# Patient Record
Sex: MALE | Race: WHITE | NOT HISPANIC OR LATINO | Employment: UNEMPLOYED | URBAN - METROPOLITAN AREA
[De-identification: names, ages, dates, MRNs, and addresses within clinical notes are randomized per-mention and may not be internally consistent; named-entity substitution may affect disease eponyms.]

---

## 2017-09-09 ENCOUNTER — HOSPITAL ENCOUNTER (EMERGENCY)
Facility: CLINIC | Age: 47
Discharge: HOME | End: 2017-09-09
Attending: EMERGENCY MEDICINE | Admitting: EMERGENCY MEDICINE
Payer: COMMERCIAL

## 2017-09-09 ENCOUNTER — APPOINTMENT (OUTPATIENT)
Dept: ULTRASOUND IMAGING | Facility: CLINIC | Age: 47
End: 2017-09-09
Attending: EMERGENCY MEDICINE
Payer: COMMERCIAL

## 2017-09-09 VITALS
HEART RATE: 93 BPM | RESPIRATION RATE: 18 BRPM | OXYGEN SATURATION: 98 % | SYSTOLIC BLOOD PRESSURE: 147 MMHG | BODY MASS INDEX: 28.7 KG/M2 | HEIGHT: 71 IN | DIASTOLIC BLOOD PRESSURE: 95 MMHG | WEIGHT: 205 LBS | TEMPERATURE: 98 F

## 2017-09-09 DIAGNOSIS — K80.20 SYMPTOMATIC CHOLELITHIASIS: ICD-10-CM

## 2017-09-09 LAB
ALBUMIN SERPL-MCNC: 3.9 G/DL (ref 3.4–5)
ALP SERPL-CCNC: 78 U/L (ref 40–150)
ALT SERPL W P-5'-P-CCNC: 60 U/L (ref 0–70)
ANION GAP SERPL CALCULATED.3IONS-SCNC: 8 MMOL/L (ref 3–14)
AST SERPL W P-5'-P-CCNC: 62 U/L (ref 0–45)
BASOPHILS # BLD AUTO: 0.1 10E9/L (ref 0–0.2)
BASOPHILS NFR BLD AUTO: 0.7 %
BILIRUB SERPL-MCNC: 0.6 MG/DL (ref 0.2–1.3)
BUN SERPL-MCNC: 17 MG/DL (ref 7–30)
CALCIUM SERPL-MCNC: 9.2 MG/DL (ref 8.5–10.1)
CHLORIDE SERPL-SCNC: 103 MMOL/L (ref 94–109)
CO2 SERPL-SCNC: 28 MMOL/L (ref 20–32)
CREAT SERPL-MCNC: 1.06 MG/DL (ref 0.66–1.25)
DIFFERENTIAL METHOD BLD: ABNORMAL
EOSINOPHIL # BLD AUTO: 0.3 10E9/L (ref 0–0.7)
EOSINOPHIL NFR BLD AUTO: 3.1 %
ERYTHROCYTE [DISTWIDTH] IN BLOOD BY AUTOMATED COUNT: 12.9 % (ref 10–15)
GFR SERPL CREATININE-BSD FRML MDRD: 75 ML/MIN/1.7M2
GLUCOSE SERPL-MCNC: 100 MG/DL (ref 70–99)
HCT VFR BLD AUTO: 50.1 % (ref 40–53)
HGB BLD-MCNC: 16.9 G/DL (ref 13.3–17.7)
IMM GRANULOCYTES # BLD: 0 10E9/L (ref 0–0.4)
IMM GRANULOCYTES NFR BLD: 0.2 %
INTERPRETATION ECG - MUSE: NORMAL
LIPASE SERPL-CCNC: 135 U/L (ref 73–393)
LYMPHOCYTES # BLD AUTO: 2.5 10E9/L (ref 0.8–5.3)
LYMPHOCYTES NFR BLD AUTO: 28.7 %
MCH RBC QN AUTO: 27.9 PG (ref 26.5–33)
MCHC RBC AUTO-ENTMCNC: 33.7 G/DL (ref 31.5–36.5)
MCV RBC AUTO: 83 FL (ref 78–100)
MONOCYTES # BLD AUTO: 1 10E9/L (ref 0–1.3)
MONOCYTES NFR BLD AUTO: 11.4 %
NEUTROPHILS # BLD AUTO: 4.9 10E9/L (ref 1.6–8.3)
NEUTROPHILS NFR BLD AUTO: 55.9 %
NRBC # BLD AUTO: 0 10*3/UL
NRBC BLD AUTO-RTO: 0 /100
PLATELET # BLD AUTO: 296 10E9/L (ref 150–450)
POTASSIUM SERPL-SCNC: 4.1 MMOL/L (ref 3.4–5.3)
PROT SERPL-MCNC: 8.1 G/DL (ref 6.8–8.8)
RBC # BLD AUTO: 6.06 10E12/L (ref 4.4–5.9)
SODIUM SERPL-SCNC: 139 MMOL/L (ref 133–144)
WBC # BLD AUTO: 8.8 10E9/L (ref 4–11)

## 2017-09-09 PROCEDURE — 85025 COMPLETE CBC W/AUTO DIFF WBC: CPT | Performed by: EMERGENCY MEDICINE

## 2017-09-09 PROCEDURE — 93005 ELECTROCARDIOGRAM TRACING: CPT

## 2017-09-09 PROCEDURE — 96361 HYDRATE IV INFUSION ADD-ON: CPT

## 2017-09-09 PROCEDURE — 83690 ASSAY OF LIPASE: CPT | Performed by: EMERGENCY MEDICINE

## 2017-09-09 PROCEDURE — 25000128 H RX IP 250 OP 636: Performed by: EMERGENCY MEDICINE

## 2017-09-09 PROCEDURE — 76705 ECHO EXAM OF ABDOMEN: CPT

## 2017-09-09 PROCEDURE — 96374 THER/PROPH/DIAG INJ IV PUSH: CPT

## 2017-09-09 PROCEDURE — 99285 EMERGENCY DEPT VISIT HI MDM: CPT | Mod: 25

## 2017-09-09 PROCEDURE — 80053 COMPREHEN METABOLIC PANEL: CPT | Performed by: EMERGENCY MEDICINE

## 2017-09-09 RX ORDER — HYDROCODONE BITARTRATE AND ACETAMINOPHEN 5; 325 MG/1; MG/1
1 TABLET ORAL EVERY 6 HOURS PRN
Qty: 15 TABLET | Refills: 0 | Status: ON HOLD | OUTPATIENT
Start: 2017-09-09 | End: 2017-09-16

## 2017-09-09 RX ORDER — MORPHINE SULFATE 4 MG/ML
4 INJECTION, SOLUTION INTRAMUSCULAR; INTRAVENOUS
Status: DISCONTINUED | OUTPATIENT
Start: 2017-09-09 | End: 2017-09-09 | Stop reason: HOSPADM

## 2017-09-09 RX ADMIN — SODIUM CHLORIDE 1000 ML: 9 INJECTION, SOLUTION INTRAVENOUS at 00:39

## 2017-09-09 RX ADMIN — MORPHINE SULFATE 4 MG: 4 INJECTION, SOLUTION INTRAMUSCULAR; INTRAVENOUS at 00:39

## 2017-09-09 ASSESSMENT — ENCOUNTER SYMPTOMS
VOMITING: 0
ABDOMINAL PAIN: 1
FEVER: 0
BLOOD IN STOOL: 0

## 2017-09-09 NOTE — ED NOTES
"Pt presents with right upper quadrant  With a \"pacratic attack\" 7/10 pain comes and goes. Has been having significant pain in the last week. ABC's intact. A&0x4  "

## 2017-09-09 NOTE — DISCHARGE INSTRUCTIONS
Discharge Instructions  Biliary Colic    You have been seen today for biliary colic. Biliary colic is the pain that happens when gallstones block the normal flow of bile from the gallbladder.  It usually is a steady or crampy pain in the upper abdomen (belly), most often under the right side of the rib cage where the gallbladder is. Sometimes you get pain from the gallbladder in your back or shoulder. It is common to have nausea (sick to stomach) and vomiting (throwing up) with biliary colic.    Bile is a liquid the body makes to help with digesting fat.  It is made by the liver and stored in the gallbladder and released from the gall bladder when you eat fatty foods. Gallstones can form for a variety of reasons. Risk factors for gallstones include being female, having a family history of gallstones, being older, being pregnant or having been pregnant, having diabetes, having rapid weight loss, and others.      Once gallstones form, surgeons usually tell you to have your gallbladder removed. There is medicine that can dissolve gallstones, but it can be unpleasant to take, and gallstones tend to come back when you quit taking the medicine. Your regular provider can help decide on the right treatment for you, and may refer you to a surgeon to discuss whether surgery is right in your case.     Complications of gallstones include infection, jaundice, inflammation of the pancreas, and rupture of the gallbladder. One of these complications will happen to about one out of every four patients with gallstones over the next 10-20 years if they are not treated.       Generally, every Emergency Department visit should have a follow-up clinic visit with either a primary or a specialty clinic/provider. Please follow-up as instructed by your emergency provider today.    Return to the Emergency Department if you develop:    Fever greater than 100.5 F.     Persistent nausea and vomiting.    Pain that will not go away with the  medicines you were given here.    Yellow skin or eye color (jaundice).    Other new concerning symptoms.    What can I do to help myself?    Eat regular meals at least three times a day, to make the gallbladder empty before it gets too full.    Avoid fried or fatty foods.    Drink plenty of clear fluids.    Take over-the-counter or prescribed pain medications as recommended by your provider.      If you were given a prescription for medicine here today, be sure to read all of the information (including the package insert) that comes with your prescription.  This will include important information about the medicine, its side effects, and any warnings that you need to know about.  The pharmacist who fills the prescription can provide more information and answer questions you may have about the medicine.  If you have questions or concerns that the pharmacist cannot address, please call or return to the Emergency Department.     Remember that you can always come back to the Emergency Department if you are not able to see your regular provider in the amount of time listed above, if you get any new symptoms, or if there is anything that worries you.

## 2017-09-09 NOTE — ED AVS SNAPSHOT
[facility] Emergency Department    [address]    [facility] [address]    Phone:  [phone]    Fax:  [phone]                                       Edward Nessa   MRN: [id number]    Department:  [facility] Emergency Department   Date of Visit:  9/9/2017           After Visit Summary Signature Page     I have received my discharge instructions, and my questions have been answered. I have discussed any challenges I see with this plan with the nurse or doctor.    ..........................................................................................................................................  Patient/Patient Representative Signature      ..........................................................................................................................................  Patient Representative Print Name and Relationship to Patient    ..................................................               ................................................  Date                                            Time    ..........................................................................................................................................  Reviewed by Signature/Title    ...................................................              ..............................................  Date                                                            Time

## 2017-09-09 NOTE — ED PROVIDER NOTES
"  History   Chief Complaint:  Abdominal Pain    HPI   Edward Szymanski is a 47 year old male with a history of pancreatitis who presents to the ED for evaluation of upper abdominal pain currently worse on the left than right; no cholelithiasis present during pancreatitis diagnosis in 2015. The patient reports he was diagnosed with pancreatitis in 2015 and was suppose to get a cholecystectomy but did not. The patient had a few minor  \"pain attacks\" in 2015 and 2016, but about 1.5 months ago he began having more prolonged intense \"pain attacks\" of abdominal pain. He denies black/bloody stool, vomiting, fever, smoking, or alcohol use.     Allergies:  No known drug allergies    Medications:    Omega-3 Fatty Acids (OMEGA-3 FISH OIL PO)   ZYRTEC 10 MG OR TABS     Past Medical History:    Hyperlipidemia  NEFTALY  Pancreatitis     Past Surgical History:    History reviewed. No pertinent surgical history.    Family History:    Muscular dystrophy     Social History:  Marital Status:   [2]  PCP Darren Henderson  Smoking status: no  Alcohol use: no    Review of Systems   Constitutional: Negative for fever.   Gastrointestinal: Positive for abdominal pain. Negative for blood in stool and vomiting.   All other systems reviewed and are negative.    Physical Exam   Patient Vitals for the past 24 hrs:   BP Temp Pulse Heart Rate Resp SpO2 Height Weight   09/09/17 0017 (!) 154/107 98  F (36.7  C) 93 93 18 98 % 1.803 m (5' 11\") 93 kg (205 lb)        Physical Exam  Constitutional: Alert, attentive, mild pain distress  HENT:    Nose: Nose normal.    Mouth/Throat: Oropharynx is clear, mucous membranes are moist  Eyes:  EOM are normal. Pupils are equal, round, and reactive to light.   CV:  regular rate and rhythm; no murmurs, rubs or gallups  Chest: Effort normal and breath sounds normal.   GI:   Epigastrium - mild tenderness, no guarding   RUQ - no tenderness or Adam's sign   RLQ - No tenderness, no guarding, no Rovsing's " sign   Suprapubic area - no tenderness, no guarding    LLQ - no tenderness, no guarding   LUQ - no tenderness, no guarding   No distension. Normal bowel sounds  MSK: Normal range of motion.   Neurological: Alert, attentive  Skin: Skin is warm and dry.      Emergency Department Course   ECG (00:19:51):  Rate 104 bpm. SD interval 150. QRS duration 94. QT/QTc 356/468. P-R-T axes 56 64 26. Sinus tachycardia, otherwise normal ECG. No significant changes compared to EKG dated 5/16/15. Interpreted at 0020 by Mike Pitts MD.    Imaging:  Radiographic findings were communicated with the patient who voiced understanding of the findings.  US abdomen   There are stones and sludge in the gallbladder. The  gallbladder wall is mildly thickened, a nonspecific finding. No other  evidence of acute cholecystitis. No biliary dilatation.  As read by Radiology.    Laboratory:  CBC: WNL (WBC 8.8, HGB 16.9, )   CMP: glucose 100(H), AST 62(H) o/w WNL (Creatinine 1.06)  Lipase: 135    Interventions:  0039 NS 1L IV Bolus  0039 Morphine 4 mg IV    Emergency Department Course:  Past medical records, nursing notes, and vitals reviewed.  0018: I performed an exam of the patient and obtained history, as documented above.  The patient was sent for an US abdomen while in the emergency department, findings above.  IV inserted and blood drawn.  0019 ECG was obtained - see results above.   0039 NS 1L IV Bolus  0039 Morphine 4 mg IV  0155: I rechecked the patient.  Findings and plan explained to the Patient. Patient discharged home with instructions regarding supportive care, medications, and reasons to return. The importance of close follow-up was reviewed.   Impression & Plan    Medical Decision Making:  Edward Szymanski is a 47 year old male with history of pancreatitis in 2015, possibly related to sludge and/or cholelithiasis (although no documented cholelithiasis is noted on imaging at the time), who presented with abdominal pain and  symptoms consistent with biliary colic.  Ultrasound has confirmed the presence of gallstones.  There is no clinical, laboratory, or ultrasound evidence of cholecystitis, choledocholithiasis, gallstone pancreatitis, or ascending cholangitis.  There are no chest pain or ACS equivalent symptoms to suggest the patient s symptoms are cardiac in nature. There is no lower abdominal tenderness to suggest appendicitis, diverticulitis, or other acute process. On recheck, he is feeling significantly better. The re-exam is benign, pain is controlled, and he is tolerating POs. I recommended avoiding fatty foods, and to return to the ED immediately for uncontrolled pain, vomiting, fever, or any other concerning symptoms. I discussed the natural history of symptomatic cholelithiasis, and I recommended outpatient follow up with general surgery in 3-5 days for further consultation and care.  Analgesics and anti-emetics have been prescribed.      Diagnosis:    ICD-10-CM   1. Symptomatic cholelithiasis K80.20     Disposition:  Discharged to home    Discharge Medications:  New Prescriptions    HYDROCODONE-ACETAMINOPHEN (NORCO) 5-325 MG PER TABLET    Take 1 tablet by mouth every 6 hours as needed for moderate to severe pain     Nanette Pacheco  9/9/2017   [facility] EMERGENCY DEPARTMENT    I, Nanette Pacheco, am serving as a scribe at 12:18 AM on 9/9/2017 to document services personally performed by Mike Pitts MD based on my observations and the provider's statements to me.        Mike Pitts MD  09/09/17 3746

## 2017-09-09 NOTE — ED AVS SNAPSHOT
[facility] Emergency Department    [address]    [place] [address]    Phone:  [phone]    Fax:  [phone]                                       Edward Szymanski   MRN: [id number]    Department:  [facility] Emergency Department   Date of Visit:  9/9/2017           Patient Information     Date Of Birth          1970        Your diagnoses for this visit were:     Symptomatic cholelithiasis        You were seen by Mike Pitts MD.      Follow-up Information     Follow up with Demond Zhou MD In 3 days.    Specialty:  General Surgery    Contact information:    [address]  [place] [address]  [phone]          Discharge Instructions       Discharge Instructions  Biliary Colic    You have been seen today for biliary colic. Biliary colic is the pain that happens when gallstones block the normal flow of bile from the gallbladder.  It usually is a steady or crampy pain in the upper abdomen (belly), most often under the right side of the rib cage where the gallbladder is. Sometimes you get pain from the gallbladder in your back or shoulder. It is common to have nausea (sick to stomach) and vomiting (throwing up) with biliary colic.    Bile is a liquid the body makes to help with digesting fat.  It is made by the liver and stored in the gallbladder and released from the gall bladder when you eat fatty foods. Gallstones can form for a variety of reasons. Risk factors for gallstones include being female, having a family history of gallstones, being older, being pregnant or having been pregnant, having diabetes, having rapid weight loss, and others.      Once gallstones form, surgeons usually tell you to have your gallbladder removed. There is medicine that can dissolve gallstones, but it can be unpleasant to take, and gallstones tend to come back when you quit taking the medicine. Your regular provider can help decide on the right treatment for  you, and may refer you to a surgeon to discuss whether surgery is right in your case.     Complications of gallstones include infection, jaundice, inflammation of the pancreas, and rupture of the gallbladder. One of these complications will happen to about one out of every four patients with gallstones over the next 10-20 years if they are not treated.       Generally, every Emergency Department visit should have a follow-up clinic visit with either a primary or a specialty clinic/provider. Please follow-up as instructed by your emergency provider today.    Return to the Emergency Department if you develop:    Fever greater than 100.5 F.     Persistent nausea and vomiting.    Pain that will not go away with the medicines you were given here.    Yellow skin or eye color (jaundice).    Other new concerning symptoms.    What can I do to help myself?    Eat regular meals at least three times a day, to make the gallbladder empty before it gets too full.    Avoid fried or fatty foods.    Drink plenty of clear fluids.    Take over-the-counter or prescribed pain medications as recommended by your provider.      If you were given a prescription for medicine here today, be sure to read all of the information (including the package insert) that comes with your prescription.  This will include important information about the medicine, its side effects, and any warnings that you need to know about.  The pharmacist who fills the prescription can provide more information and answer questions you may have about the medicine.  If you have questions or concerns that the pharmacist cannot address, please call or return to the Emergency Department.     Remember that you can always come back to the Emergency Department if you are not able to see your regular provider in the amount of time listed above, if you get any new symptoms, or if there is anything that worries you.      24 Hour Appointment Hotline       To make an appointment at  any [facility] clinic, call [phone] ([phone]). If you don't have a family doctor or clinic, we will help you find one. [facility] clinics are conveniently located to serve the needs of you and your family.             Review of your medicines      START taking        Dose / Directions Last dose taken    HYDROcodone-acetaminophen 5-325 MG per tablet   Commonly known as:  NORCO   Dose:  1 tablet   Quantity:  15 tablet        Take 1 tablet by mouth every 6 hours as needed for moderate to severe pain   Refills:  0          Our records show that you are taking the medicines listed below. If these are incorrect, please call your family doctor or clinic.        Dose / Directions Last dose taken    OMEGA-3 FISH OIL PO        Take by mouth daily   Refills:  0        ZYRTEC 10 MG tablet   Quantity:  3 MONTHS   Generic drug:  cetirizine        ONE TABLET DAILY   Refills:  3                Prescriptions were sent or printed at these locations (1 Prescription)                   Other Prescriptions                Printed at Department/Unit printer (1 of 1)         HYDROcodone-acetaminophen (NORCO) 5-325 MG per tablet                Procedures and tests performed during your visit     CBC + differential    Comprehensive metabolic panel    EKG 12 lead    Lipase    US Abdomen Limited      Orders Needing Specimen Collection     None      Pending Results     Date and Time Order Name Status Description    9/9/2017 0023 US Abdomen Limited Preliminary     9/9/2017 0017 EKG 12 lead Preliminary             Pending Culture Results     No orders found from 9/7/2017 to 9/10/2017.            Pending Results Instructions     If you had any lab results that were not finalized at the time of your Discharge, you can call the ED Lab Result RN at [phone]. You will be contacted by this team for any positive Lab results or changes in treatment. The nurses are available 7 days a week from 10A to 6:30P.  You can leave a message 24 hours  per day and they will return your call.        Test Results From Your Hospital Stay        9/9/2017 12:41 AM      Component Results     Component Value Ref Range & Units Status    WBC 8.8 4.0 - 11.0 10e9/L Final    RBC Count 6.06 (H) 4.4 - 5.9 10e12/L Final    Hemoglobin 16.9 13.3 - 17.7 g/dL Final    Hematocrit 50.1 40.0 - 53.0 % Final    MCV 83 78 - 100 fl Final    MCH 27.9 26.5 - 33.0 pg Final    MCHC 33.7 31.5 - 36.5 g/dL Final    RDW 12.9 10.0 - 15.0 % Final    Platelet Count 296 150 - 450 10e9/L Final    Diff Method Automated Method  Final    % Neutrophils 55.9 % Final    % Lymphocytes 28.7 % Final    % Monocytes 11.4 % Final    % Eosinophils 3.1 % Final    % Basophils 0.7 % Final    % Immature Granulocytes 0.2 % Final    Nucleated RBCs 0 0 /100 Final    Absolute Neutrophil 4.9 1.6 - 8.3 10e9/L Final    Absolute Lymphocytes 2.5 0.8 - 5.3 10e9/L Final    Absolute Monocytes 1.0 0.0 - 1.3 10e9/L Final    Absolute Eosinophils 0.3 0.0 - 0.7 10e9/L Final    Absolute Basophils 0.1 0.0 - 0.2 10e9/L Final    Abs Immature Granulocytes 0.0 0 - 0.4 10e9/L Final    Absolute Nucleated RBC 0.0  Final         9/9/2017 12:58 AM      Component Results     Component Value Ref Range & Units Status    Sodium 139 133 - 144 mmol/L Final    Potassium 4.1 3.4 - 5.3 mmol/L Final    Chloride 103 94 - 109 mmol/L Final    Carbon Dioxide 28 20 - 32 mmol/L Final    Anion Gap 8 3 - 14 mmol/L Final    Glucose 100 (H) 70 - 99 mg/dL Final    Urea Nitrogen 17 7 - 30 mg/dL Final    Creatinine 1.06 0.66 - 1.25 mg/dL Final    GFR Estimate 75 >60 mL/min/1.7m2 Final    Non  GFR Calc    GFR Estimate If Black >90 >60 mL/min/1.7m2 Final    African American GFR Calc    Calcium 9.2 8.5 - 10.1 mg/dL Final    Bilirubin Total 0.6 0.2 - 1.3 mg/dL Final    Albumin 3.9 3.4 - 5.0 g/dL Final    Protein Total 8.1 6.8 - 8.8 g/dL Final    Alkaline Phosphatase 78 40 - 150 U/L Final    ALT 60 0 - 70 U/L Final    AST 62 (H) 0 - 45 U/L Final          9/9/2017 12:58 AM      Component Results     Component Value Ref Range & Units Status    Lipase 135 73 - 393 U/L Final         9/9/2017  1:26 AM      Narrative     US ABDOMEN LIMITED  9/9/2017 1:20 AM      HISTORY: Epigastric pain, right upper quadrant pain, history of  pancreatitis.     COMPARISON: None.    FINDINGS: The liver is normal in size and texture without focal mass.  There is no intra or extrahepatic biliary dilatation. The common  hepatic duct measures 0.5 cm. There are stones and sludge in the  gallbladder. The gallbladder is distended to 13 cm in length. The  gallbladder wall is mildly thickened. No sonographic Adam's sign.  The pancreas head and body appear normal. The tail is obscured by  bowel gas. The right kidney measures 11.4 cm and is normal in  appearance. The proximal abdominal aorta and IVC appear normal.         Impression     IMPRESSION: There are stones and sludge in the gallbladder. The  gallbladder wall is mildly thickened, a nonspecific finding. No other  evidence of acute cholecystitis. No biliary dilatation.                Clinical Quality Measure: Blood Pressure Screening     Your blood pressure was checked while you were in the emergency department today. The last reading we obtained was  BP: (!) 147/95 . Please read the guidelines below about what these numbers mean and what you should do about them.  If your systolic blood pressure (the top number) is less than 120 and your diastolic blood pressure (the bottom number) is less than 80, then your blood pressure is normal. There is nothing more that you need to do about it.  If your systolic blood pressure (the top number) is 120-139 or your diastolic blood pressure (the bottom number) is 80-89, your blood pressure may be higher than it should be. You should have your blood pressure rechecked within a year by a primary care provider.  If your systolic blood pressure (the top number) is 140 or greater or your diastolic blood  pressure (the bottom number) is 90 or greater, you may have high blood pressure. High blood pressure is treatable, but if left untreated over time it can put you at risk for heart attack, stroke, or kidney failure. You should have your blood pressure rechecked by a primary care provider within the next 4 weeks.  If your provider in the emergency department today gave you specific instructions to follow-up with your doctor or provider even sooner than that, you should follow that instruction and not wait for up to 4 weeks for your follow-up visit.        Thank you for choosing [facility]       Thank you for choosing [facility] for your care. Our goal is always to provide you with excellent care. Hearing back from our patients is one way we can continue to improve our services. Please take a few minutes to complete the written survey that you may receive in the mail after you visit with us. Thank you!        [employer] Information     [employer] gives you secure access to your electronic health record. If you see a primary care provider, you can also send messages to your care team and make appointments. If you have questions, please call your primary care clinic.  If you do not have a primary care provider, please call [phone] and they will assist you.        Care EveryWhere ID     This is your Care EveryWhere ID. This could be used by other organizations to access your [facility] medical records  [id number]        Equal Access to Services     OMID LOPEZ : Cayetano Leigh, waaxda luqadaha, qaybta kaalmada adelidia, natalya ross. So [facility] [phone].    ATENCIÓN: Si habla español, tiene a bell disposición servicios gratuitos de asistencia lingüística. Llame al [phone].    We comply with applicable federal civil rights laws and [place] laws. We do not discriminate on the basis of race, color, national origin, age, disability sex, sexual orientation or gender  identity.            After Visit Summary       This is your record. Keep this with you and show to your community pharmacist(s) and doctor(s) at your next visit.

## 2017-09-11 ENCOUNTER — HOSPITAL ENCOUNTER (INPATIENT)
Facility: CLINIC | Age: 47
LOS: 5 days | Discharge: HOME | DRG: 444 | End: 2017-09-16
Attending: EMERGENCY MEDICINE | Admitting: INTERNAL MEDICINE
Payer: COMMERCIAL

## 2017-09-11 ENCOUNTER — APPOINTMENT (OUTPATIENT)
Dept: ULTRASOUND IMAGING | Facility: CLINIC | Age: 47
DRG: 444 | End: 2017-09-11
Attending: EMERGENCY MEDICINE
Payer: COMMERCIAL

## 2017-09-11 DIAGNOSIS — K80.50 CHOLEDOCHOLITHIASIS: ICD-10-CM

## 2017-09-11 DIAGNOSIS — K85.90 ACUTE PANCREATITIS, UNSPECIFIED COMPLICATION STATUS, UNSPECIFIED PANCREATITIS TYPE: ICD-10-CM

## 2017-09-11 DIAGNOSIS — R79.89 ELEVATED LFTS: ICD-10-CM

## 2017-09-11 LAB
ALBUMIN SERPL-MCNC: 3.8 G/DL (ref 3.4–5)
ALP SERPL-CCNC: 243 U/L (ref 40–150)
ALT SERPL W P-5'-P-CCNC: 520 U/L (ref 0–70)
ANION GAP SERPL CALCULATED.3IONS-SCNC: 8 MMOL/L (ref 3–14)
AST SERPL W P-5'-P-CCNC: 176 U/L (ref 0–45)
BASOPHILS # BLD AUTO: 0 10E9/L (ref 0–0.2)
BASOPHILS NFR BLD AUTO: 0.1 %
BILIRUB SERPL-MCNC: 5.7 MG/DL (ref 0.2–1.3)
BUN SERPL-MCNC: 10 MG/DL (ref 7–30)
CALCIUM SERPL-MCNC: 9.3 MG/DL (ref 8.5–10.1)
CHLORIDE SERPL-SCNC: 101 MMOL/L (ref 94–109)
CO2 SERPL-SCNC: 28 MMOL/L (ref 20–32)
CREAT SERPL-MCNC: 0.82 MG/DL (ref 0.66–1.25)
DIFFERENTIAL METHOD BLD: ABNORMAL
EOSINOPHIL # BLD AUTO: 0.1 10E9/L (ref 0–0.7)
EOSINOPHIL NFR BLD AUTO: 0.4 %
ERYTHROCYTE [DISTWIDTH] IN BLOOD BY AUTOMATED COUNT: 13 % (ref 10–15)
GFR SERPL CREATININE-BSD FRML MDRD: >90 ML/MIN/1.7M2
GLUCOSE SERPL-MCNC: 135 MG/DL (ref 70–99)
HCT VFR BLD AUTO: 49 % (ref 40–53)
HGB BLD-MCNC: 16.8 G/DL (ref 13.3–17.7)
IMM GRANULOCYTES # BLD: 0.1 10E9/L (ref 0–0.4)
IMM GRANULOCYTES NFR BLD: 0.4 %
LIPASE SERPL-CCNC: ABNORMAL U/L (ref 73–393)
LYMPHOCYTES # BLD AUTO: 0.7 10E9/L (ref 0.8–5.3)
LYMPHOCYTES NFR BLD AUTO: 5.3 %
MCH RBC QN AUTO: 28.3 PG (ref 26.5–33)
MCHC RBC AUTO-ENTMCNC: 34.3 G/DL (ref 31.5–36.5)
MCV RBC AUTO: 83 FL (ref 78–100)
MONOCYTES # BLD AUTO: 0.8 10E9/L (ref 0–1.3)
MONOCYTES NFR BLD AUTO: 5.8 %
NEUTROPHILS # BLD AUTO: 11.9 10E9/L (ref 1.6–8.3)
NEUTROPHILS NFR BLD AUTO: 88 %
NRBC # BLD AUTO: 0 10*3/UL
NRBC BLD AUTO-RTO: 0 /100
PLATELET # BLD AUTO: 268 10E9/L (ref 150–450)
POTASSIUM SERPL-SCNC: 3.9 MMOL/L (ref 3.4–5.3)
PROT SERPL-MCNC: 7.7 G/DL (ref 6.8–8.8)
RBC # BLD AUTO: 5.93 10E12/L (ref 4.4–5.9)
SODIUM SERPL-SCNC: 137 MMOL/L (ref 133–144)
WBC # BLD AUTO: 13.5 10E9/L (ref 4–11)

## 2017-09-11 PROCEDURE — 85025 COMPLETE CBC W/AUTO DIFF WBC: CPT | Performed by: EMERGENCY MEDICINE

## 2017-09-11 PROCEDURE — 12000000 ZZH R&B MED SURG/OB

## 2017-09-11 PROCEDURE — 83690 ASSAY OF LIPASE: CPT | Performed by: EMERGENCY MEDICINE

## 2017-09-11 PROCEDURE — 96375 TX/PRO/DX INJ NEW DRUG ADDON: CPT

## 2017-09-11 PROCEDURE — 25000128 H RX IP 250 OP 636: Performed by: INTERNAL MEDICINE

## 2017-09-11 PROCEDURE — 80053 COMPREHEN METABOLIC PANEL: CPT | Performed by: EMERGENCY MEDICINE

## 2017-09-11 PROCEDURE — 99285 EMERGENCY DEPT VISIT HI MDM: CPT | Mod: 25

## 2017-09-11 PROCEDURE — 25000125 ZZHC RX 250: Performed by: INTERNAL MEDICINE

## 2017-09-11 PROCEDURE — 76705 ECHO EXAM OF ABDOMEN: CPT

## 2017-09-11 PROCEDURE — 96365 THER/PROPH/DIAG IV INF INIT: CPT

## 2017-09-11 PROCEDURE — 99207 ZZC CDG-CODE CATEGORY CHANGED: CPT | Performed by: INTERNAL MEDICINE

## 2017-09-11 PROCEDURE — 99221 1ST HOSP IP/OBS SF/LOW 40: CPT | Performed by: INTERNAL MEDICINE

## 2017-09-11 PROCEDURE — 96361 HYDRATE IV INFUSION ADD-ON: CPT

## 2017-09-11 PROCEDURE — 25000128 H RX IP 250 OP 636: Performed by: EMERGENCY MEDICINE

## 2017-09-11 RX ORDER — PROCHLORPERAZINE 25 MG
25 SUPPOSITORY, RECTAL RECTAL EVERY 12 HOURS PRN
Status: DISCONTINUED | OUTPATIENT
Start: 2017-09-11 | End: 2017-09-16 | Stop reason: HOSPADM

## 2017-09-11 RX ORDER — ONDANSETRON 2 MG/ML
4 INJECTION INTRAMUSCULAR; INTRAVENOUS ONCE
Status: COMPLETED | OUTPATIENT
Start: 2017-09-11 | End: 2017-09-11

## 2017-09-11 RX ORDER — HYDROMORPHONE HYDROCHLORIDE 1 MG/ML
0.5 INJECTION, SOLUTION INTRAMUSCULAR; INTRAVENOUS; SUBCUTANEOUS ONCE
Status: COMPLETED | OUTPATIENT
Start: 2017-09-11 | End: 2017-09-11

## 2017-09-11 RX ORDER — IBUPROFEN 200 MG
400 TABLET ORAL 3 TIMES DAILY PRN
COMMUNITY

## 2017-09-11 RX ORDER — AMPICILLIN AND SULBACTAM 2; 1 G/1; G/1
3 INJECTION, POWDER, FOR SOLUTION INTRAMUSCULAR; INTRAVENOUS ONCE
Status: COMPLETED | OUTPATIENT
Start: 2017-09-11 | End: 2017-09-11

## 2017-09-11 RX ORDER — CETIRIZINE HYDROCHLORIDE 10 MG/1
10 TABLET ORAL DAILY PRN
COMMUNITY

## 2017-09-11 RX ORDER — NALOXONE HYDROCHLORIDE 0.4 MG/ML
.1-.4 INJECTION, SOLUTION INTRAMUSCULAR; INTRAVENOUS; SUBCUTANEOUS
Status: DISCONTINUED | OUTPATIENT
Start: 2017-09-11 | End: 2017-09-16 | Stop reason: HOSPADM

## 2017-09-11 RX ORDER — ONDANSETRON 2 MG/ML
4 INJECTION INTRAMUSCULAR; INTRAVENOUS EVERY 6 HOURS PRN
Status: DISCONTINUED | OUTPATIENT
Start: 2017-09-11 | End: 2017-09-16 | Stop reason: HOSPADM

## 2017-09-11 RX ORDER — HYDROMORPHONE HYDROCHLORIDE 1 MG/ML
.3-.5 INJECTION, SOLUTION INTRAMUSCULAR; INTRAVENOUS; SUBCUTANEOUS
Status: DISCONTINUED | OUTPATIENT
Start: 2017-09-11 | End: 2017-09-16 | Stop reason: HOSPADM

## 2017-09-11 RX ORDER — PROCHLORPERAZINE MALEATE 5 MG
5-10 TABLET ORAL EVERY 6 HOURS PRN
Status: DISCONTINUED | OUTPATIENT
Start: 2017-09-11 | End: 2017-09-16 | Stop reason: HOSPADM

## 2017-09-11 RX ORDER — SODIUM CHLORIDE 9 MG/ML
INJECTION, SOLUTION INTRAVENOUS CONTINUOUS
Status: DISCONTINUED | OUTPATIENT
Start: 2017-09-11 | End: 2017-09-14

## 2017-09-11 RX ORDER — ONDANSETRON 4 MG/1
4 TABLET, ORALLY DISINTEGRATING ORAL EVERY 6 HOURS PRN
Status: DISCONTINUED | OUTPATIENT
Start: 2017-09-11 | End: 2017-09-16 | Stop reason: HOSPADM

## 2017-09-11 RX ORDER — AMPICILLIN AND SULBACTAM 2; 1 G/1; G/1
3 INJECTION, POWDER, FOR SOLUTION INTRAMUSCULAR; INTRAVENOUS EVERY 6 HOURS
Status: DISCONTINUED | OUTPATIENT
Start: 2017-09-12 | End: 2017-09-16 | Stop reason: HOSPADM

## 2017-09-11 RX ADMIN — ONDANSETRON 4 MG: 4 TABLET, ORALLY DISINTEGRATING ORAL at 20:31

## 2017-09-11 RX ADMIN — SODIUM CHLORIDE 1000 ML: 9 INJECTION, SOLUTION INTRAVENOUS at 18:37

## 2017-09-11 RX ADMIN — SODIUM CHLORIDE 1000 ML: 9 INJECTION, SOLUTION INTRAVENOUS at 16:12

## 2017-09-11 RX ADMIN — HYDROMORPHONE HYDROCHLORIDE 0.5 MG: 1 INJECTION, SOLUTION INTRAMUSCULAR; INTRAVENOUS; SUBCUTANEOUS at 21:05

## 2017-09-11 RX ADMIN — AMPICILLIN SODIUM AND SULBACTAM SODIUM 3 G: 2; 1 INJECTION, POWDER, FOR SOLUTION INTRAMUSCULAR; INTRAVENOUS at 23:43

## 2017-09-11 RX ADMIN — AMPICILLIN SODIUM AND SULBACTAM SODIUM 3 G: 2; 1 INJECTION, POWDER, FOR SOLUTION INTRAMUSCULAR; INTRAVENOUS at 17:31

## 2017-09-11 RX ADMIN — HYDROMORPHONE HYDROCHLORIDE 0.5 MG: 1 INJECTION, SOLUTION INTRAMUSCULAR; INTRAVENOUS; SUBCUTANEOUS at 16:06

## 2017-09-11 RX ADMIN — ONDANSETRON 4 MG: 2 INJECTION INTRAMUSCULAR; INTRAVENOUS at 16:12

## 2017-09-11 RX ADMIN — HYDROMORPHONE HYDROCHLORIDE 0.5 MG: 1 INJECTION, SOLUTION INTRAMUSCULAR; INTRAVENOUS; SUBCUTANEOUS at 23:43

## 2017-09-11 RX ADMIN — PROCHLORPERAZINE EDISYLATE 10 MG: 5 INJECTION INTRAMUSCULAR; INTRAVENOUS at 21:23

## 2017-09-11 RX ADMIN — SODIUM CHLORIDE: 9 INJECTION, SOLUTION INTRAVENOUS at 20:56

## 2017-09-11 RX ADMIN — HYDROMORPHONE HYDROCHLORIDE 0.5 MG: 1 INJECTION, SOLUTION INTRAMUSCULAR; INTRAVENOUS; SUBCUTANEOUS at 18:58

## 2017-09-11 ASSESSMENT — PAIN DESCRIPTION - DESCRIPTORS
DESCRIPTORS: ACHING;PRESSURE;SHOOTING
DESCRIPTORS: ACHING

## 2017-09-11 ASSESSMENT — ENCOUNTER SYMPTOMS
CONSTIPATION: 1
FEVER: 0
ABDOMINAL PAIN: 1
NAUSEA: 1

## 2017-09-11 NOTE — ED PROVIDER NOTES
History     Chief Complaint:  Abdominal Pain    HPI   Edward Szymanski is a 47 year old male who presents to the emergency department today for evaluation of abdominal pain. The patient was recently here in the emergency department on Saturday morning, 09/09/2017, for evaluation of abdominal pain. The patient had a work up as per below, and was then discharged with a diagnosis of biliary colic and given a prescription for Norco. The patient reports that since that time he has controlled his pain with Ibuprofen during the day and has only been using the Norco to help him sleep. Today, the patient was fine in the morning but at 1200 his abdominal pain got much worse so he took a Norco. The Norco did not help with his pain so he decided to come here to the emergency department again for evaluation. Here in the emergency department the patient reports that his pain has progressively gotten worse. He characterizes his mid abdominal pain as a sharp pain with pressure and reports that he has not found any alleviating body positions. The patient denies any fevers but endorses some nausea. The patient also notes that his urine has been yellow/orange and he has been constipated since his time in the emergency department on Friday. The patient notes that two years ago he was diagnosed with pancreatitis. Since that time, the patient has been careful with his eating but concedes that since July of this year he has not been as diligent.    09/09/2017 - US abdomen   There are stones and sludge in the gallbladder. The  gallbladder wall is mildly thickened, a nonspecific finding. No other  evidence of acute cholecystitis. No biliary dilatation.  As read by Radiology.     09/09/2017 - Laboratory  CBC: WBC 8.8, HGB 16.9,   CMP: Glucose 100 (H), AST 62 (H) o/w WNL (Creatinine 1.06)  Lipase: 135    Allergies:  No Known Drug Allergies    Medications:    Norco  Zyrtec     Past Medical History:    Allergic rhinitis  High  cholesterol  Hyperlipidemia     Past Surgical History:    Surgical history reviewed. No pertinent surgical history.     Family History:    Father: Muscular Dystrophy    Social History:  The patient was accompanied to the ED by his wife.  Smoking Status: Never Smoker  Smokeless Tobacco: Never Used  Alcohol Use: Positive  Marital Status:   [2]     Review of Systems   Constitutional: Negative for fever.   Gastrointestinal: Positive for abdominal pain, constipation and nausea.   Genitourinary:        Yellow/orange urine   All other systems reviewed and are negative.    Physical Exam     Vitals:  Patient Vitals for the past 24 hrs:   BP Temp Temp src Pulse Resp SpO2   09/11/17 1601 130/83 - - - - -   09/11/17 1514 (!) 148/105 98.7  F (37.1  C) Temporal 87 18 97 %     Physical Exam  Constitutional: The patient is oriented to person, place, and time. Alert and cooperative.  HENT:   Right Ear: External ear normal.   Left Ear: External ear normal.   Nose: Nose normal.   Mouth/Throat: Uvula is midline, oropharynx is clear and moist and mucous membranes are normal. No posterior oropharyngeal edema or erythema.   Eyes: Mildly icteric sclera. EOM and lids are normal. Pupils are equal, round, and reactive to light.   Neck: Trachea normal. Normal range of motion. Neck supple.   Cardiovascular: Normal rate, regular rhythm, normal heart sounds, and intact distal pulses.    Pulmonary/Chest: Effort normal and breath sounds equal bilaterally. No crackles or wheezing.   Abdominal: Soft. Diffuse upper abdominal tenderness to palpation. No rebound and no guarding.   Musculoskeletal: Normal range of motion.  No extremity tenderness or edema.  Neurological: Alert and Oriented. Strength 5/5 in upper and lower extremities bilaterally. Sensation intact to light touch throughout.  Skin: Skin is dry. No rash noted. Jaundice present.          Emergency Department Course     Imaging:  Radiology findings were communicated with the patient  who voiced understanding of the findings.    Abdomen US, limited (RUQ only)  Cholelithiasis and gallbladder sludge, dilated 0.8 cm  common hepatic duct. Gallbladder mildly distended 12 cm in length.  Borderline gallbladder wall thickening 0.4 cm. Cannot rule out early  cholecystitis, although patient took pain medication and sonographic  Adam's sign is negative.  YESI DALE MD  Reading per radiology    Laboratory:  Laboratory findings were communicated with the patient who voiced understanding of the findings.    CBC: WBC 13.5 (H), HGB 16.8,   CMP: Glucose 135 (H), Bilirubin 5.7 (H), Alkaline Phosphatase 243 (H),  (H) o/w WNL (Creatinine 0.82)  Lipase: 67676 (H)     Interventions:  1606 Dilaudid 0.5 mg IV   1612 NS 1000 ml IV   1612 Zofran 4 mg IV  1731 Unasyn 3 gm IV    Emergency Department Course:    1552 Nursing notes and vitals reviewed.    1600 I performed an exam of the patient as documented above.     1610 IV was inserted and blood was drawn for laboratory testing, results above.     1617 The patient was sent for a Abdomen US, limited (RUQ only) while in the emergency department, results above.      1708 I spoke with Dr. Jovani Conner of the hospitalist service regarding patient's presentation, findings, and plan of care.     1711 The patient was rechecked and updated.    1711 I discussed the treatment plan with the patient. They expressed understanding of this plan and consented to admission. I discussed the patient with Dr. Conner, who will admit the patient to a monitored bed for further evaluation and treatment.     1711 I personally reviewed the laboratory and imaging results with the patient and answered all related questions prior to admission.    1727 I spoke with Dr. Kelin Lopez of GI regarding patient's presentation, findings, and plan of care.     Impression & Plan      Medical Decision Making:  Edwardabdias Szymanski is a 47 year old male with a history of hyperlipidemia who  presents to the emergency department for evaluation of abdominal pain. Upon presentation in the emergency department, the patient is non toxic appearing. He is hypertensive, but vitals are otherwise within normal limits and stable. On exam, he is well appearing. He is alert, oriented, and neurologic exam is non focal. Cardiopulmonary exam is unremarkable. On abdominal exam, he has diffuse upper abdominal tenderness with palpation. There is no rebounding or guarding. On skin examination he does appear somewhat jaundiced. The rest of his examination is as mentioned above. Of note, per review of records, the patient was evaluated in the emergency department two days ago for similar symptoms. At that time, he was ultimately diagnosed with biliary colic and discharged to home. Labs were obtained today and are as mentioned above. Notably he does have a leukocytosis of 13.5. His LFT's are also abnormal with an elevated bilirubin, elevated alkaline phosphate, and significantly elevated ALT and AST. His lipase is also significantly elevated. Ultrasound of the right upper quadrant was obtained and does demonstrate cholelithiasis with gallbladder sludge. The common hepatic duct does appear dilated. Per radiology, the gallbladder is mildly distended and there is borderline gallbladder wall thickening. Radiology notes that this could represent early cholecystitis, although the Adam's sign is negative. These results were discussed with the patient and he notes understanding. Given these findings, I am suspicious for a choledocholithiasis. He also has evidence of pancreatitis, likely secondary to gallstones. He was discussed with the GI physician and they were in agreement with and antibiotics and admission at this time. He was given a dose of Unasyn. He was then discussed with the hospitalist and they agreed to accept this patient. He was stable/improved at the time of admission.     Diagnosis:    ICD-10-CM    1.  Choledocholithiasis K80.50 Comprehensive metabolic panel     Lipase   2. Acute pancreatitis, unspecified complication status, unspecified pancreatitis type K85.90    3. Elevated LFTs R79.89      Disposition:   The patient is admitted into the care of Dr. Conner.    Scribe Disclosure:  I, Mike Gracia, am serving as a scribe at 4:02 PM on 9/11/2017 to document services personally performed by Priyanka Akhtar MD, based on my observations and the provider's statements to me.    [facility] EMERGENCY DEPARTMENT       Priyanka Akhtar MD  09/12/17 6535

## 2017-09-11 NOTE — PHARMACY-ADMISSION MEDICATION HISTORY
Admission medication history interview status for this patient is complete. See [facility] admission navigator for allergy information, prior to admission medications and immunization status.     Medication history interview source(s):Patient  Medication history resources (including written lists, pill bottles, clinic record):Epic    Changes made to PTA medication list:  Added:  Rhinocort prn, Ibuprofen prn  Deleted: Fish oil  Changed: Zyrtec to prn    Medication reconciliation/reorder completed by provider prior to medication history? No      Prior to Admission medications    Medication Sig Last Dose Taking? Auth Provider   cetirizine (ZYRTEC) 10 MG tablet Take 10 mg by mouth daily as needed for allergies  Yes Unknown, Entered By History   budesonide (RHINOCORT ALLERGY) 32 MCG/ACT spray Spray 1 spray into both nostrils daily as needed for rhinitis or allergies  Yes Unknown, Entered By History   ibuprofen (ADVIL/MOTRIN) 200 MG tablet Take 400 mg by mouth 3 times daily as needed for mild pain  Yes Unknown, Entered By History   HYDROcodone-acetaminophen (NORCO) 5-325 MG per tablet Take 1 tablet by mouth every 6 hours as needed for moderate to severe pain 9/11/2017 at Unknown time Yes Mike Pitts MD

## 2017-09-11 NOTE — IP AVS SNAPSHOT
[place] [address] [facility]    [address]    [facility] [address]    Phone:  [phone]    Fax:  [phone]                                       After Visit Summary   9/11/2017    Edward Nessa    MRN: [id number]           After Visit Summary Signature Page     I have received my discharge instructions, and my questions have been answered. I have discussed any challenges I see with this plan with the nurse or doctor.    ..........................................................................................................................................  Patient/Patient Representative Signature      ..........................................................................................................................................  Patient Representative Print Name and Relationship to Patient    ..................................................               ................................................  Date                                            Time    ..........................................................................................................................................  Reviewed by Signature/Title    ...................................................              ..............................................  Date                                                            Time

## 2017-09-11 NOTE — ED NOTES
Patient presents to the ED with upper abdominal pain. Seen in the ED on Friday night and diagnosed with biliary colic. Reports pain became significantly worse today. Taken 4 norco in the past 2 hours with no relief.

## 2017-09-11 NOTE — ED NOTES
[facility]  ED Nurse Handoff Report    Edward Szymanski is a 47 year old male   ED Chief complaint: Abdominal Pain  . ED Diagnosis:   Final diagnoses:   Choledocholithiasis     Allergies: No Known Allergies    Code Status: Full Code  Activity level - Baseline/Home:  Independent. Activity Level - Current:   Stand with Assist. Lift room needed: No. Bariatric: No   Needed: No   Isolation: No. Infection: Not Applicable.     Vital Signs:   Vitals:    09/11/17 1514 09/11/17 1601   BP: (!) 148/105 130/83   Pulse: 87    Resp: 18    Temp: 98.7  F (37.1  C)    TempSrc: Temporal    SpO2: 97%        Cardiac Rhythm:  ,      Pain level: 0-10 Pain Scale: 10  Patient confused: No. Patient Falls Risk: Yes.   Elimination Status: Has voided   Patient Report - Initial Complaint: Abdomen pain. Focused Assessment:Edward Szymanski is a 47 year old male who presents to the emergency department today for evaluation of abdominal pain. The patient was recently here in the emergency department on Saturday morning, 09/09/2017, for evaluation of abdominal pain. The patient had a work up as per below, and was then discharged with a diagnosis of symptomatic chololithiasis and given a prescription for Norco. The patient reports that since that time he has controlled his pain with Ibuprofen during the day and has only been using the Norco to help him sleep. Today, the patient was fine in the morning but at 1200 his abdominal pain got much worse so he took a Norco. The Norco did not help with his pain so he decided to come here to the emergency department again for evaluation. Here in the emergency department the patient reports that his pain has progressively gotten worse. He characterizes his mid abdominal pain as a sharp pain with pressure and reports that he has not found any alleviating body positions. The patient denies any fevers but endorses some nausea. The patient also notes that his urine has been yellow/orange and he has  been constipated since his time in the emergency department on Friday. The patient notes that two years ago he was diagnosed with pancreatitis. Since that time, the patient has been careful with his eating but concedes that since July of this year he has not been as diligent.      Tests Performed: Ultrasound. Abnormal Results:   Labs Ordered and Resulted from Time of ED Arrival Up to the Time of Departure from the ED   CBC WITH PLATELETS DIFFERENTIAL - Abnormal; Notable for the following:        Result Value    WBC 13.5 (*)     RBC Count 5.93 (*)     Absolute Neutrophil 11.9 (*)     Absolute Lymphocytes 0.7 (*)     All other components within normal limits   COMPREHENSIVE METABOLIC PANEL - Abnormal; Notable for the following:     Glucose 135 (*)     Bilirubin Total 5.7 (*)     Alkaline Phosphatase 243 (*)      (*)     All other components within normal limits   LIPASE   ROUTINE UA WITH MICROSCOPIC     Abdomen US, limited (RUQ only)   Final Result   IMPRESSION: Cholelithiasis and gallbladder sludge, dilated 0.8 cm   common hepatic duct. Gallbladder mildly distended 12 cm in length.   Borderline gallbladder wall thickening 0.4 cm. Cannot rule out early   cholecystitis, although patient took pain medication and sonographic   Adam's sign is negative.      YESI DALE MD        Treatments provided: Antibiotics,   Family Comments: Went home  OBS brochure/video discussed/provided to patient:  NA  ED Medications:   Medications   ampicillin-sulbactam (UNASYN) 3 g vial to attach to  mL bag (3 g Intravenous New Bag 9/11/17 1731)   HYDROmorphone (PF) (DILAUDID) injection 0.5 mg (0.5 mg Intravenous Given 9/11/17 1606)   0.9% sodium chloride BOLUS (1,000 mLs Intravenous New Bag 9/11/17 1612)   ondansetron (ZOFRAN) injection 4 mg (4 mg Intravenous Given 9/11/17 1612)     Drips infusing:  Yes  For the majority of the shift this patient was Green. Interventions performed were NA.     Severe Sepsis OR Septic Shock  Diagnosis Present: No      ED Nurse Name/Phone Number: Melva Tapia,   5:10 PM  RECEIVING UNIT ED HANDOFF REVIEW    Above ED Nurse Handoff Report was reviewed: Yes  Reviewed by: Stephanie Guevara on September 11, 2017 at 5:40 PM

## 2017-09-11 NOTE — H&P
CHIEF COMPLAINT:  Abdominal pain and dry heaving.      HISTORY OF PRESENT ILLNESS:  Edward Szymanski is a 47-year-old gentleman with no significant past medical history who presented initially to the ER on 09/09/2017 with abdominal pain.  At that time he had an ultrasound which showed stones and sludge in the gallbladder with some mild gallbladder wall thickening.  He was discharged home.  He returned home only to start having abdominal pain today.  He describes the pain as worse in the epigastric region, radiating through both flanks to the back, accompanied with nausea and dry heaving with an intensity of pain at 8 out of 10.  He denies any fevers or chills.  He denies any chest pain, shortness of breath, lightheadedness or dizziness.  He has also noticed that his urine has been more orange, he has been a bit more constipated.  In the ER over here, the patient was seen by Dr. Priyanka Stanley, I discussed the case with her.  He is noted to have an increased bilirubin, increased alkaline phosphatase, increased LFTs, all of which are new.  Imaging carried out over here included an ultrasound of the abdomen, which showed cholelithiasis with possible early cholecystitis and some dilation of the common hepatic duct.  I am asked to admit him for further evaluation.      PAST MEDICAL HISTORY:  Significant for hyperlipidemia, allergic rhinitis.     Prior to Admission medications    Medication Sig Last Dose Taking? Auth Provider   cetirizine (ZYRTEC) 10 MG tablet Take 10 mg by mouth daily as needed for allergies   Yes Unknown, Entered By History   budesonide (RHINOCORT ALLERGY) 32 MCG/ACT spray Spray 1 spray into both nostrils daily as needed for rhinitis or allergies   Yes Unknown, Entered By History   ibuprofen (ADVIL/MOTRIN) 200 MG tablet Take 400 mg by mouth 3 times daily as needed for mild pain   Yes Unknown, Entered By History   HYDROcodone-acetaminophen (NORCO) 5-325 MG per tablet Take 1 tablet by mouth every 6 hours as  needed for moderate to severe pain 9/11/2017 at Unknown time Yes Mike Pitts MD          PAST SURGICAL HISTORY:  Extensively reviewed and noncontributory.      FAMILY HISTORY:  Significant for father with muscular dystrophy.      SOCIAL HISTORY:  He is , he is a nonsmoker, does not drink alcohol.      REVIEW OF SYSTEMS:  As mentioned in the HPI.  All other systems extensively reviewed and are deemed unremarkable and negative.  Of note, he denies any chest pain, shortness of breath, lightheadedness or dizziness.      PHYSICAL EXAMINATION:   VITAL SIGNS:  Temperature is 98.7, pulse is 87, blood pressure is 130/83, respiratory rate 18, O2 sat is 97% on room air.   IN GENERAL:  He is alert, awake, oriented, coherent, nontoxic, in no acute distress.   HEENT:  His pupils are equal, round, react to light.  Icteric sclerae.   LUNGS:  Clear to auscultation.   HEART:  Regular rate, S1, S2 normal.   ABDOMEN:  Soft, tender in the epigastric region, no guarding, no rigidity, he has good bowel sounds.   EXTREMITIES:  There is no edema.      LABORATORY:  Labwork obtained here included a CMP which is grossly unremarkable, other than a total bilirubin of 5.7, alkaline phosphatase of 243.  His ALT is pending.  His AST is 176.  On his CBC, his white cell count is 13.5 with an absolute neutrophil count of 11.9.  An ultrasound of the abdomen carried out today showed cholelithiasis and gallbladder sludge dilated at 0.8 cm common hepatic duct, gallbladder mildly distended 12 cm in length, borderline gallbladder wall thickening at 0.4 cm, cannot rule out acute cholecystitis.      ASSESSMENT AND PLAN:  Choledocholithiasis with acute cholecystitis and acute pancreatitis.  We will admit him under inpatient status.  He has been initiated on intravenous Unasyn, which I will continue.  We will keep him n.p.o.  We will consult Gastroenterology for possible endoscopic retrograde cholangiopancreatography.  He also may require  surgical consultation for cholecystectomy thereafter; hence, I will consult both Gastroenterology as well as General Surgery.  We will hydrate him.  We will keep him n.p.o. with intravenous analgesia and intravenous antiemetics.  His code status is full code.  He will be admitted on an observation status.         SHANIKA MARTINEZ MD             D: 2017 17:29   T: 2017 17:45   MT: [id number]      Name:     BEHZAD WOODS   MRN:      [id number]        Account:      [id number]   :      1970           Admitted:     [id number]      Document: [id number]

## 2017-09-11 NOTE — IP AVS SNAPSHOT
MRN:[id number]                      After Visit Summary   9/11/2017    Edwardabdias Szymanski    MRN: [id number]           Thank you!     Thank you for choosing [facility] for your care. Our goal is always to provide you with excellent care. Hearing back from our patients is one way we can continue to improve our services. Please take a few minutes to complete the written survey that you may receive in the mail after you visit. If you would like to speak to someone directly about your visit please contact Patient Relations at [phone]. Thank you!          Patient Information     Date Of Birth          1970        Designated Caregiver       Most Recent Value    Caregiver    Will someone help with your care after discharge? yes    Name of designated caregiver Shagufta Szymanski    Phone number of caregiver [phone]    Caregiver address same as pt      About your hospital stay     You were admitted on:  September 11, 2017 You last received care in the:  [place] [address] [facility]    You were discharged on:  September 16, 2017       Who to Call     For medical emergencies, please call 911.  For non-urgent questions about your medical care, please call your primary care provider or clinic, [phone]  For questions related to your surgery, please call your surgery clinic        Attending Provider     Provider Specialty    Priyanka Akhtar MD Emergency Medicine    Jovani Conner MD Internal Medicine       Primary Care Provider Office Phone # Fax #    Darren Lefty Henderson [phone]      Follow-up Appointments     Follow-up and recommended labs and tests        F/U with Dr. Olguin as scheduled for cholecystectomy  Eat a low fat diet until then                  Your next 10 appointments already scheduled     Sep 29, 2017   Procedure with Demond Zhou MD   [facility] (--)    [address] [facility] [id number] [phone]          "   Sep 29, 2017 10:15 AM CDT   [facility] Same Day Surgery with Demond Zhou MD, Sosa Mancia PA-C   [facility] ([facility])    [facility] ([facility])   [phone]              Pending Results     No orders found from 9/9/2017 to 9/12/2017.            Statement of Approval     Ordered          09/16/17 1402  I have reviewed and agree with all the recommendations and orders detailed in this document.  EFFECTIVE NOW     Approved and electronically signed by:  Ml Parsons MD             Admission Information     Date & Time Provider Department Dept. Phone    9/11/2017 Jovani Conner MD [place] [address] [facility] [phone]      Your Vitals Were     Blood Pressure Pulse Temperature Respirations Height Pulse Oximetry    137/94 (BP Location: Left arm) 103 98.6  F (37  C) (Oral) 16 1.803 m (5' 10.98\") 97%      MyChart Information     [facility] gives you secure access to your electronic health record. If you see a primary care provider, you can also send messages to your care team and make appointments. If you have questions, please call your primary care clinic.  If you do not have a primary care provider, please call [phone] and they will assist you.        Care EveryWhere ID     This is your Care EveryWhere ID. This could be used by other organizations to access your [facility] medical records  [id number]        Equal Access to Services     [facility]: Hadii barbara caldwello Sokacy, waaxda luqadaha, qaybta kaalmada adeegyada, natalya ross. So [facility] [phone].    ATENCIÓN: Si habla español, tiene a bell disposición servicios gratuitos de asistencia lingüística. Llame al [phone].    We comply with applicable federal civil rights laws and [place] laws. We do not discriminate on the basis of race, color, national origin, age, disability sex, sexual orientation or " gender identity.               Review of your medicines      CONTINUE these medicines which have NOT CHANGED        Dose / Directions    cetirizine 10 MG tablet   Commonly known as:  zyrTEC        Dose:  10 mg   Take 10 mg by mouth daily as needed for allergies   Refills:  0       ibuprofen 200 MG tablet   Commonly known as:  ADVIL/MOTRIN        Dose:  400 mg   Take 400 mg by mouth 3 times daily as needed for mild pain   Refills:  0       RHINOCORT ALLERGY 32 MCG/ACT spray   Generic drug:  budesonide        Dose:  1 spray   Spray 1 spray into both nostrils daily as needed for rhinitis or allergies   Refills:  0         STOP taking     HYDROcodone-acetaminophen 5-325 MG per tablet   Commonly known as:  NORCO                    Protect others around you: Learn how to safely use, store and throw away your medicines at www.disposemymeds.org.             Medication List: This is a list of all your medications and when to take them. Check marks below indicate your daily home schedule. Keep this list as a reference.      Medications           Morning Afternoon Evening Bedtime As Needed    cetirizine 10 MG tablet   Commonly known as:  zyrTEC   Take 10 mg by mouth daily as needed for allergies                                ibuprofen 200 MG tablet   Commonly known as:  ADVIL/MOTRIN   Take 400 mg by mouth 3 times daily as needed for mild pain                                RHINOCORT ALLERGY 32 MCG/ACT spray   Spray 1 spray into both nostrils daily as needed for rhinitis or allergies   Generic drug:  budesonide                                          More Information        Discharge Instructions for Acute Pancreatitis  You have been diagnosed with acute pancreatitis. Your pancreas is inflamed or swollen. The pancreas is an organ that makes digestive juices and hormones. Gallstones are a common cause of pancreatitis. These hard stones form in the gallbladder. The gallbladder shares a tube with the pancreas into the small  intestine. If gallstones block this tube, fluid can t leave the pancreas. The fluid backs up and causes redness and swelling (inflammation). There are other causes of pancreatitis. Make sure you understand the cause of your pancreatitis. Then you can try to stop it from happening again.  Immediate home care    Find someone to drive you to appointments. Acute pancreatitis is a serious condition, and you should never drive if you are experiencing symptoms.    Stop drinking if your illness was caused by alcohol.    Ask your healthcare provider about alcohol abuse programs and support groups such as Alcoholics Anonymous.    Ask your provider about prescription medicines that can help you stop drinking.    Tell your provider about the alcohol withdrawal symptoms you have when you stop drinking. This is very important. You may need close medical supervision and special medicines when you stop drinking. This will depend on your alcohol withdrawal history.     Take your medicines exactly as directed. Don t skip doses.    Eat a low-fat diet. Ask your provider for menus and other diet information.    Learn to take your own pulse. Keep a record of your results. Ask your provider which readings mean that you need medical attention.  Ongoing care    Tell your provider about any medicines you are taking. Some medicines can cause this condition.    Before starting any new medicine, ask your provider if it will harm your pancreas. This includes any new over-the-counter medicines, vitamins, or herbal supplements.      Tell your provider if you lose weight without dieting.    Be aware of symptoms that may mean your pancreatitis has come back. These symptoms include belly pain, nausea and vomiting, and fever.    Keep all follow-up appointments with your provider. Problems can often show up later.  Follow-up  Follow up with your healthcare provider, or as advised.  When to call your provider  Call your healthcare provider right away if  you have any of the following:    Fever of 100.4 F (38.0 C) or higher, or as advised by your provider    Severe pain from your upper belly to your back    Nausea and vomiting    Feely dizzy or lightheaded    Yellowing of your skin or eyes (jaundice)    Bruises on your belly or back    Belly swelling and tenderness    Rapid pulse    Shallow, fast breathing   Date Last Reviewed: 8/1/2016 2000-2017 [employer]. [address] [place] [address], [place] [address]. All rights reserved. This information is not intended as a substitute for professional medical care. Always follow your healthcare professional's instructions.              Eating a Low-fat Diet  Your doctor has ordered a low-fat diet for you. You must limit your total fat intake to ______ grams per day. Do not worry about the type of fat, only the total amount of fat.  You should eat fat-free milk products and lean meats. Avoid pastries and desserts. Limit margarine, butter, salad dressings and other added fats.  The guidelines below will help you keep your diet low in fat.    Avoid cooking with fat and deep-fat frying. Food will soak up some of the cooking fat. Use non-stick pans or coat your pans with non-stick spray.    Do not eat deep-fat fried foods.    Trim all the fat from meats and remove the skin from chicken and turkey.    Meats may be boiled, poached, steamed, broiled, roasted, stewed, grilled or cooked in a microwave. Place meats for roasting on a rack so the fat can drain off while cooking.    Try dishes without meat such as chili with beans, lentil soup and pasta with meatless tomato sauce.    Look for low-fat or no-fat milk products.    Cool homemade soups and stews in the fridge. Skim the fat from the top before serving.    If you feel hungry, snack on low-fat yogurt, fresh fruit, raw vegetables and low-fat dip.    Avoid avocado, peanuts and other nuts. These are high in fat.    Season foods with lemon, herbs, spices or imitation  butter.  Also, it is important to know the amount of fat in food products. You can read nutrition labels to find the fat content. See the back of this sheet for directions on how to read nutrition labels.  How to read nutrition labels   You will find a nutrition label on most food items you buy. This is a tool to help you make good food choices.  First, look at the serving size. This tells you how large one serving will be.   Then, look at the servings per container to check how many servings are in each package. Even a small package can have more than one serving. If you eat two servings, you will get twice the number of fat grams listed on the label.   Total fat is the total amount of saturated, trans fat, polyunsaturated and monounsaturated fats in one serving. The type of fat is not important, just the total amount of fat you eat each day.     For informational purposes only. Not to replace the advice of your health care provider.   Copyright   2006 [facility]. All rights reserved. [employer] 522394 - REV 09/15.            Low-Fat Diet    A low-fat diet will help you lose weight. It also can lower cholesterol and prevent symptoms of gallbladder disease. The average American diet contains up to 50% fat. This means that 50% of all calories come from fat (about 80 grams to 100 grams of fat per day). Choosing normal portions of foods from the list below can help lower your fat intake. Experts recommend that only 20% to 35% of your daily calories come from fat. The remaining 65% to 80% of calories will come from protein and carbohydrates. This is much healthier for you.  Breads  Ok: Whole-wheat or rye bread, lupe or soda crackers, leslye toast, plain rolls, bagels, English muffins  Avoid: Rolls and breads containing whole milk or egg; waffles, pancakes, biscuits, corn bread; cheese crackers, other flavored crackers, pastries, doughnuts  Cereals  Ok: Oatmeal, whole-wheat, bran, multigrain, rice  Avoid:  Granola or other cereals that have oil, coconut, or more than 2 grams of fat per serving  Cheese and eggs  Ok: Cheeses labeled low-fat; 3 whole eggs per week; egg whites and egg substitutes as desired  Avoid: All other cheeses  Desserts  Ok: Gelatin, slushy, juanis food cake, meringues, nonfat yogurt, and puddings or sherbet made with nonfat milk  Avoid: Any other store-bought desserts, or desserts that have fat, whole milk, cream, chocolate, and coconut  Drinks  Ok: Nonfat milk, coffee, tea, fizzy (carbonated) drinks  Avoid: Whole and reduced-fat milk, evaporated and condensed milk, hot chocolate mixes, milk shakes, malts, eggnog  Fats  Ok: You may have up to 3 teaspoons of fat daily. This can be butter, margarine, mayonnaise, or healthy oils (canola or olive)  Avoid: Cream, nondairy creams, cream cheese, gravies, and cream sauces  Fruits  Ok: All fruits made without fat  Avoid: Coconut, olives  Meats, poultry, fish  Ok: Limit meat to 6 ounces daily (broiled, roasted, baked, grilled, or boiled). Buy lean cuts, and trim off the fat. Try beef, fish, lamb, pork, and canned fish packed in water; also chicken and turkey with the skin removed.  Avoid: Fried meats, fish, or poultry; fried eggs, and fish canned in oils; fatty meats such as bustos, sausage, corned beef, hot dogs, and lunch meats; meats with gravies and sauces  Potatoes, beans, pasta  Ok: Dried beans, split peas, lentils, potatoes, rice, pasta made without added fat  Avoid: French fries, potato chips, potatoes prepared with butter, refried beans  Soups  Ok: Clear broth soups without fat and with allowed vegetables  Avoid: Cream-based soups  Vegetables  Ok: Fresh, frozen, canned or dried vegetables, all made without added fat  Avoid: Fried vegetables and those prepared with butter, cream, sauces  Miscellaneous  Ok: Salt, sugar, jelly, hard candy, marshmallows, honey, syrup, spices and herbs, mustard, ketchup, lemon, and vinegar. Try to limit sweets and added  sugars.  Avoid: Chocolate, nuts, coconut, and cream candies; sunflower, sesame, and other seeds; fried foods; cream sauces and gravies; pizza  Date Last Reviewed: 8/1/2016 2000-2017 [employer]. [address] [place] [address]. All rights reserved. This information is not intended as a substitute for professional medical care. Always follow your healthcare professional's instructions.                Adding Flavor to Low-Fat Meals    There are endless ways to add more variety and flavor to your diet. You don t need salt or high-fat additions.    Keep plenty of fresh fruit on hand. Try adding it to your main dishes. For example, peaches go well with chicken. They can be very flavorful in casseroles or with roasted poultry. Bananas or raisins add flavor to braga dishes with a Luciano theme.    Buy fruits and vegetables you haven t tried before. Often, recipes or suggestions for their use will be listed in the produce section at the grocery store. This is often the case with more exotic or rare foods. Perrpers can add sweet or hot characteristics to your dish.    Go to the cookbook section at the bookstore or library. Many of the unique flavors we associate with Wolof, , or Luciano dishes come from the seasonings used in their recipes. Try one new recipe a week. You ll soon know what spices to add to a dish to give it the taste of exotic places.    Marinate meats, poultry, and fish before grilling or baking. Try mixtures of wine, fruit juice, low-sodium tomato juice, vinegar, lemon juice, herbs, and spices. Be aware that soy sauce and teriyaki sauce are high in sodium. Use low-sodium versions, and use less of them. Kirsty, dry karla, and sesame seeds can add Asian flavors to foods.    High-heat cooking. Consider cooking meat, poultry and fish by pan-searing, grilling, or broiling. These methods use high-heat and add flavor.    Hit the juice. Add citrus juice or peel to help boost  flavor.    [place] it up. Grilling vegetables add a different layer of flavor than other cooking methods.  Date Last Reviewed: 6/8/2015 2000-2017 [employer]. [address] [place] [address], [place] [address]. All rights reserved. This information is not intended as a substitute for professional medical care. Always follow your healthcare professional's instructions.                Low-Fat Cooking Tips  To eat less fat, you may need to learn some new ways to cook. But that doesn't mean you have to eat bland, boring food. And it doesn't mean cooking needs to take any more time. Here are some tips for cooking and seasoning foods with less fat.     Broil fish instead of frying it. And sprinkle on herbs to add flavor.    Try New Cooking Methods    Broil, roast, bake, steam, or microwave fish, chicken, turkey, and meat.    Remove skin from chicken and turkey and trim extra fat from meat before cooking.    Sprinkle herbs on meat, chicken, and fish, and in soups.    Cook in broth instead of fat.    Use nonstick cooking sprays or nonstick pans.    Steam or microwave vegetables without adding fat. Serve with herbs, lemon juice, vinegar, or fat-free butter-flavored powder.    To flavor beans and rice, add chopped onions, garlic, and peppers.    Chill soups and stews. Before reheating and serving, skim off the fat.    When you add fat, use canola or olive oil instead of butter or lard.  Lighten Up Your Recipes    In soups and sauces: Replace whole milk or cream with low-fat milk, evaporated fat-free milk, or nonfat dry milk.    In puddings and other desserts: Replace whole milk or cream with low-fat milk or fat-free condensed milk.    To make dips and toppings: Use low-fat or nonfat cottage cheese or sour cream.    To make salad dressings: Use nonfat yogurt or low-fat buttermilk.    In place of 1 whole egg in recipes: Use 2 egg whites or 1/4 cup egg substitute.    In place of regular cheese: Use fat-free or reduced-fat  cheese.  Date Last Reviewed: 5/11/2015 2000-2017 [employer]. [address] [place] [address], [place] [address]. All rights reserved. This information is not intended as a substitute for professional medical care. Always follow your healthcare professional's instructions.

## 2017-09-12 ENCOUNTER — APPOINTMENT (OUTPATIENT)
Dept: INTERVENTIONAL RADIOLOGY/VASCULAR | Facility: CLINIC | Age: 47
DRG: 444 | End: 2017-09-12
Attending: PHYSICIAN ASSISTANT
Payer: COMMERCIAL

## 2017-09-12 ENCOUNTER — ANESTHESIA (OUTPATIENT)
Dept: SURGERY | Facility: CLINIC | Age: 47
DRG: 444 | End: 2017-09-12
Payer: COMMERCIAL

## 2017-09-12 ENCOUNTER — SURGERY (OUTPATIENT)
Age: 47
End: 2017-09-12

## 2017-09-12 ENCOUNTER — ANESTHESIA EVENT (OUTPATIENT)
Dept: SURGERY | Facility: CLINIC | Age: 47
DRG: 444 | End: 2017-09-12
Payer: COMMERCIAL

## 2017-09-12 LAB
ALBUMIN SERPL-MCNC: 3.1 G/DL (ref 3.4–5)
ALP SERPL-CCNC: 231 U/L (ref 40–150)
ALT SERPL W P-5'-P-CCNC: 434 U/L (ref 0–70)
ANION GAP SERPL CALCULATED.3IONS-SCNC: 6 MMOL/L (ref 3–14)
AST SERPL W P-5'-P-CCNC: 151 U/L (ref 0–45)
BASOPHILS # BLD AUTO: 0 10E9/L (ref 0–0.2)
BASOPHILS NFR BLD AUTO: 0.2 %
BILIRUB SERPL-MCNC: 5.5 MG/DL (ref 0.2–1.3)
BUN SERPL-MCNC: 10 MG/DL (ref 7–30)
CALCIUM SERPL-MCNC: 8.2 MG/DL (ref 8.5–10.1)
CHLORIDE SERPL-SCNC: 107 MMOL/L (ref 94–109)
CO2 SERPL-SCNC: 25 MMOL/L (ref 20–32)
CREAT SERPL-MCNC: 0.69 MG/DL (ref 0.66–1.25)
DIFFERENTIAL METHOD BLD: ABNORMAL
EOSINOPHIL # BLD AUTO: 0 10E9/L (ref 0–0.7)
EOSINOPHIL NFR BLD AUTO: 0 %
ERCP: NORMAL
ERYTHROCYTE [DISTWIDTH] IN BLOOD BY AUTOMATED COUNT: 13.2 % (ref 10–15)
GFR SERPL CREATININE-BSD FRML MDRD: >90 ML/MIN/1.7M2
GLUCOSE SERPL-MCNC: 102 MG/DL (ref 70–99)
HCT VFR BLD AUTO: 46.3 % (ref 40–53)
HGB BLD-MCNC: 15.4 G/DL (ref 13.3–17.7)
IMM GRANULOCYTES # BLD: 0.1 10E9/L (ref 0–0.4)
IMM GRANULOCYTES NFR BLD: 0.4 %
LYMPHOCYTES # BLD AUTO: 0.8 10E9/L (ref 0.8–5.3)
LYMPHOCYTES NFR BLD AUTO: 6.6 %
MCH RBC QN AUTO: 27.9 PG (ref 26.5–33)
MCHC RBC AUTO-ENTMCNC: 33.3 G/DL (ref 31.5–36.5)
MCV RBC AUTO: 84 FL (ref 78–100)
MONOCYTES # BLD AUTO: 0.9 10E9/L (ref 0–1.3)
MONOCYTES NFR BLD AUTO: 7.2 %
NEUTROPHILS # BLD AUTO: 10.1 10E9/L (ref 1.6–8.3)
NEUTROPHILS NFR BLD AUTO: 85.6 %
NRBC # BLD AUTO: 0 10*3/UL
NRBC BLD AUTO-RTO: 0 /100
PLATELET # BLD AUTO: 273 10E9/L (ref 150–450)
POTASSIUM SERPL-SCNC: 3.8 MMOL/L (ref 3.4–5.3)
PROT SERPL-MCNC: 6.4 G/DL (ref 6.8–8.8)
RBC # BLD AUTO: 5.52 10E12/L (ref 4.4–5.9)
SODIUM SERPL-SCNC: 138 MMOL/L (ref 133–144)
WBC # BLD AUTO: 11.8 10E9/L (ref 4–11)

## 2017-09-12 PROCEDURE — 80053 COMPREHEN METABOLIC PANEL: CPT | Performed by: INTERNAL MEDICINE

## 2017-09-12 PROCEDURE — 0FC98ZZ EXTIRPATION OF MATTER FROM COMMON BILE DUCT, VIA NATURAL OR ARTIFICIAL OPENING ENDOSCOPIC: ICD-10-PCS | Performed by: INTERNAL MEDICINE

## 2017-09-12 PROCEDURE — 99207 ZZC CDG-MDM COMPONENT: MEETS LOW - DOWN CODED: CPT | Performed by: INTERNAL MEDICINE

## 2017-09-12 PROCEDURE — 27210995 ZZH RX 272: Performed by: INTERNAL MEDICINE

## 2017-09-12 PROCEDURE — 36000052 ZZH SURGERY LEVEL 2 EA 15 ADDTL MIN: Performed by: INTERNAL MEDICINE

## 2017-09-12 PROCEDURE — 37000009 ZZH ANESTHESIA TECHNICAL FEE, EACH ADDTL 15 MIN: Performed by: INTERNAL MEDICINE

## 2017-09-12 PROCEDURE — 12000007 ZZH R&B INTERMEDIATE

## 2017-09-12 PROCEDURE — 36415 COLL VENOUS BLD VENIPUNCTURE: CPT | Performed by: INTERNAL MEDICINE

## 2017-09-12 PROCEDURE — 85025 COMPLETE CBC W/AUTO DIFF WBC: CPT | Performed by: INTERNAL MEDICINE

## 2017-09-12 PROCEDURE — 40000306 ZZH STATISTIC PRE PROC ASSESS II: Performed by: INTERNAL MEDICINE

## 2017-09-12 PROCEDURE — 0F758ZZ DILATION OF RIGHT HEPATIC DUCT, VIA NATURAL OR ARTIFICIAL OPENING ENDOSCOPIC: ICD-10-PCS | Performed by: INTERNAL MEDICINE

## 2017-09-12 PROCEDURE — 25000125 ZZHC RX 250: Performed by: PHYSICIAN ASSISTANT

## 2017-09-12 PROCEDURE — 25000128 H RX IP 250 OP 636: Performed by: INTERNAL MEDICINE

## 2017-09-12 PROCEDURE — 25000128 H RX IP 250 OP 636: Performed by: ANESTHESIOLOGY

## 2017-09-12 PROCEDURE — 25000566 ZZH SEVOFLURANE, EA 15 MIN: Performed by: INTERNAL MEDICINE

## 2017-09-12 PROCEDURE — 27210794 ZZH OR GENERAL SUPPLY STERILE: Performed by: INTERNAL MEDICINE

## 2017-09-12 PROCEDURE — 37000008 ZZH ANESTHESIA TECHNICAL FEE, 1ST 30 MIN: Performed by: INTERNAL MEDICINE

## 2017-09-12 PROCEDURE — 25000128 H RX IP 250 OP 636: Performed by: NURSE ANESTHETIST, CERTIFIED REGISTERED

## 2017-09-12 PROCEDURE — 40000067 ZZH STATISTIC ERCP (OR PROCEDURE): Performed by: INTERNAL MEDICINE

## 2017-09-12 PROCEDURE — 36000054 ZZH SURGERY LEVEL 2 W FLUORO 1ST 30 MIN: Performed by: INTERNAL MEDICINE

## 2017-09-12 PROCEDURE — 99253 IP/OBS CNSLTJ NEW/EST LOW 45: CPT | Performed by: SURGERY

## 2017-09-12 PROCEDURE — 71000012 ZZH RECOVERY PHASE 1 LEVEL 1 FIRST HR: Performed by: INTERNAL MEDICINE

## 2017-09-12 PROCEDURE — C1726 CATH, BAL DIL, NON-VASCULAR: HCPCS | Performed by: INTERNAL MEDICINE

## 2017-09-12 PROCEDURE — 25000125 ZZHC RX 250: Performed by: NURSE ANESTHETIST, CERTIFIED REGISTERED

## 2017-09-12 PROCEDURE — 99231 SBSQ HOSP IP/OBS SF/LOW 25: CPT | Performed by: INTERNAL MEDICINE

## 2017-09-12 RX ORDER — NEOSTIGMINE METHYLSULFATE 1 MG/ML
VIAL (ML) INJECTION PRN
Status: DISCONTINUED | OUTPATIENT
Start: 2017-09-12 | End: 2017-09-12

## 2017-09-12 RX ORDER — HYDROMORPHONE HYDROCHLORIDE 1 MG/ML
.3-.5 INJECTION, SOLUTION INTRAMUSCULAR; INTRAVENOUS; SUBCUTANEOUS EVERY 5 MIN PRN
Status: DISCONTINUED | OUTPATIENT
Start: 2017-09-12 | End: 2017-09-12 | Stop reason: HOSPADM

## 2017-09-12 RX ORDER — INDOMETHACIN 50 MG/1
100 SUPPOSITORY RECTAL
Status: COMPLETED | OUTPATIENT
Start: 2017-09-12 | End: 2017-09-12

## 2017-09-12 RX ORDER — DEXAMETHASONE SODIUM PHOSPHATE 4 MG/ML
4 INJECTION, SOLUTION INTRA-ARTICULAR; INTRALESIONAL; INTRAMUSCULAR; INTRAVENOUS; SOFT TISSUE
Status: DISCONTINUED | OUTPATIENT
Start: 2017-09-12 | End: 2017-09-12 | Stop reason: HOSPADM

## 2017-09-12 RX ORDER — DEXAMETHASONE SODIUM PHOSPHATE 4 MG/ML
INJECTION, SOLUTION INTRA-ARTICULAR; INTRALESIONAL; INTRAMUSCULAR; INTRAVENOUS; SOFT TISSUE PRN
Status: DISCONTINUED | OUTPATIENT
Start: 2017-09-12 | End: 2017-09-12

## 2017-09-12 RX ORDER — SODIUM CHLORIDE, SODIUM LACTATE, POTASSIUM CHLORIDE, CALCIUM CHLORIDE 600; 310; 30; 20 MG/100ML; MG/100ML; MG/100ML; MG/100ML
INJECTION, SOLUTION INTRAVENOUS CONTINUOUS
Status: DISCONTINUED | OUTPATIENT
Start: 2017-09-12 | End: 2017-09-12 | Stop reason: HOSPADM

## 2017-09-12 RX ORDER — GLYCOPYRROLATE 0.2 MG/ML
INJECTION, SOLUTION INTRAMUSCULAR; INTRAVENOUS PRN
Status: DISCONTINUED | OUTPATIENT
Start: 2017-09-12 | End: 2017-09-12

## 2017-09-12 RX ORDER — NALOXONE HYDROCHLORIDE 0.4 MG/ML
.1-.4 INJECTION, SOLUTION INTRAMUSCULAR; INTRAVENOUS; SUBCUTANEOUS
Status: ACTIVE | OUTPATIENT
Start: 2017-09-12 | End: 2017-09-13

## 2017-09-12 RX ORDER — HYDRALAZINE HYDROCHLORIDE 20 MG/ML
2.5-5 INJECTION INTRAMUSCULAR; INTRAVENOUS EVERY 10 MIN PRN
Status: DISCONTINUED | OUTPATIENT
Start: 2017-09-12 | End: 2017-09-12 | Stop reason: HOSPADM

## 2017-09-12 RX ORDER — FENTANYL CITRATE 50 UG/ML
INJECTION, SOLUTION INTRAMUSCULAR; INTRAVENOUS PRN
Status: DISCONTINUED | OUTPATIENT
Start: 2017-09-12 | End: 2017-09-12

## 2017-09-12 RX ORDER — PROPOFOL 10 MG/ML
INJECTION, EMULSION INTRAVENOUS PRN
Status: DISCONTINUED | OUTPATIENT
Start: 2017-09-12 | End: 2017-09-12

## 2017-09-12 RX ORDER — METOCLOPRAMIDE 10 MG/1
10 TABLET ORAL EVERY 6 HOURS PRN
Status: DISCONTINUED | OUTPATIENT
Start: 2017-09-12 | End: 2017-09-12 | Stop reason: HOSPADM

## 2017-09-12 RX ORDER — METOCLOPRAMIDE HYDROCHLORIDE 5 MG/ML
10 INJECTION INTRAMUSCULAR; INTRAVENOUS EVERY 6 HOURS PRN
Status: DISCONTINUED | OUTPATIENT
Start: 2017-09-12 | End: 2017-09-12 | Stop reason: HOSPADM

## 2017-09-12 RX ORDER — DROPERIDOL 2.5 MG/ML
0.62 INJECTION, SOLUTION INTRAMUSCULAR; INTRAVENOUS
Status: DISCONTINUED | OUTPATIENT
Start: 2017-09-12 | End: 2017-09-12 | Stop reason: RX

## 2017-09-12 RX ORDER — SODIUM CHLORIDE, SODIUM LACTATE, POTASSIUM CHLORIDE, CALCIUM CHLORIDE 600; 310; 30; 20 MG/100ML; MG/100ML; MG/100ML; MG/100ML
INJECTION, SOLUTION INTRAVENOUS CONTINUOUS
Status: DISCONTINUED | OUTPATIENT
Start: 2017-09-12 | End: 2017-09-12 | Stop reason: CLARIF

## 2017-09-12 RX ORDER — DIMENHYDRINATE 50 MG/ML
25 INJECTION, SOLUTION INTRAMUSCULAR; INTRAVENOUS
Status: DISCONTINUED | OUTPATIENT
Start: 2017-09-12 | End: 2017-09-12 | Stop reason: HOSPADM

## 2017-09-12 RX ORDER — MEPERIDINE HYDROCHLORIDE 25 MG/ML
12.5 INJECTION INTRAMUSCULAR; INTRAVENOUS; SUBCUTANEOUS EVERY 5 MIN PRN
Status: DISCONTINUED | OUTPATIENT
Start: 2017-09-12 | End: 2017-09-12 | Stop reason: HOSPADM

## 2017-09-12 RX ORDER — LIDOCAINE 40 MG/G
CREAM TOPICAL
Status: DISCONTINUED | OUTPATIENT
Start: 2017-09-12 | End: 2017-09-13

## 2017-09-12 RX ORDER — ONDANSETRON 2 MG/ML
4 INJECTION INTRAMUSCULAR; INTRAVENOUS EVERY 30 MIN PRN
Status: DISCONTINUED | OUTPATIENT
Start: 2017-09-12 | End: 2017-09-12 | Stop reason: HOSPADM

## 2017-09-12 RX ORDER — ONDANSETRON 4 MG/1
4 TABLET, ORALLY DISINTEGRATING ORAL EVERY 30 MIN PRN
Status: DISCONTINUED | OUTPATIENT
Start: 2017-09-12 | End: 2017-09-12 | Stop reason: HOSPADM

## 2017-09-12 RX ORDER — LIDOCAINE 40 MG/G
CREAM TOPICAL
Status: DISCONTINUED | OUTPATIENT
Start: 2017-09-12 | End: 2017-09-16 | Stop reason: HOSPADM

## 2017-09-12 RX ORDER — KETOROLAC TROMETHAMINE 30 MG/ML
30 INJECTION, SOLUTION INTRAMUSCULAR; INTRAVENOUS
Status: DISCONTINUED | OUTPATIENT
Start: 2017-09-12 | End: 2017-09-12 | Stop reason: HOSPADM

## 2017-09-12 RX ORDER — LIDOCAINE HYDROCHLORIDE 10 MG/ML
INJECTION, SOLUTION INFILTRATION; PERINEURAL PRN
Status: DISCONTINUED | OUTPATIENT
Start: 2017-09-12 | End: 2017-09-12

## 2017-09-12 RX ORDER — FLUMAZENIL 0.1 MG/ML
0.2 INJECTION, SOLUTION INTRAVENOUS
Status: ACTIVE | OUTPATIENT
Start: 2017-09-12 | End: 2017-09-13

## 2017-09-12 RX ORDER — FENTANYL CITRATE 50 UG/ML
25-50 INJECTION, SOLUTION INTRAMUSCULAR; INTRAVENOUS
Status: DISCONTINUED | OUTPATIENT
Start: 2017-09-12 | End: 2017-09-12 | Stop reason: HOSPADM

## 2017-09-12 RX ORDER — LABETALOL HYDROCHLORIDE 5 MG/ML
10 INJECTION, SOLUTION INTRAVENOUS
Status: DISCONTINUED | OUTPATIENT
Start: 2017-09-12 | End: 2017-09-12 | Stop reason: HOSPADM

## 2017-09-12 RX ADMIN — MIDAZOLAM HYDROCHLORIDE 2 MG: 1 INJECTION, SOLUTION INTRAMUSCULAR; INTRAVENOUS at 15:11

## 2017-09-12 RX ADMIN — PROCHLORPERAZINE EDISYLATE 5 MG: 5 INJECTION INTRAMUSCULAR; INTRAVENOUS at 07:52

## 2017-09-12 RX ADMIN — Medication 4.5 MG: at 15:30

## 2017-09-12 RX ADMIN — LIDOCAINE HYDROCHLORIDE 50 MG: 10 INJECTION, SOLUTION INFILTRATION; PERINEURAL at 15:19

## 2017-09-12 RX ADMIN — AMPICILLIN SODIUM AND SULBACTAM SODIUM 3 G: 2; 1 INJECTION, POWDER, FOR SOLUTION INTRAMUSCULAR; INTRAVENOUS at 11:28

## 2017-09-12 RX ADMIN — ONDANSETRON 4 MG: 2 INJECTION INTRAMUSCULAR; INTRAVENOUS at 15:31

## 2017-09-12 RX ADMIN — HYDROMORPHONE HYDROCHLORIDE 0.5 MG: 1 INJECTION, SOLUTION INTRAMUSCULAR; INTRAVENOUS; SUBCUTANEOUS at 11:23

## 2017-09-12 RX ADMIN — SODIUM CHLORIDE 34 ML GIVEN: 900 IRRIGANT IRRIGATION at 15:39

## 2017-09-12 RX ADMIN — AMPICILLIN SODIUM AND SULBACTAM SODIUM 3 G: 2; 1 INJECTION, POWDER, FOR SOLUTION INTRAMUSCULAR; INTRAVENOUS at 06:10

## 2017-09-12 RX ADMIN — SODIUM CHLORIDE: 9 INJECTION, SOLUTION INTRAVENOUS at 02:30

## 2017-09-12 RX ADMIN — INDOMETHACIN 100 MG: 50 SUPPOSITORY RECTAL at 15:42

## 2017-09-12 RX ADMIN — AMPICILLIN SODIUM AND SULBACTAM SODIUM 3 G: 2; 1 INJECTION, POWDER, FOR SOLUTION INTRAMUSCULAR; INTRAVENOUS at 18:05

## 2017-09-12 RX ADMIN — ROCURONIUM BROMIDE 50 MG: 10 INJECTION INTRAVENOUS at 15:19

## 2017-09-12 RX ADMIN — ONDANSETRON 4 MG: 2 INJECTION INTRAMUSCULAR; INTRAVENOUS at 04:43

## 2017-09-12 RX ADMIN — HYDROMORPHONE HYDROCHLORIDE 0.5 MG: 1 INJECTION, SOLUTION INTRAMUSCULAR; INTRAVENOUS; SUBCUTANEOUS at 07:49

## 2017-09-12 RX ADMIN — FENTANYL CITRATE 100 MCG: 50 INJECTION, SOLUTION INTRAMUSCULAR; INTRAVENOUS at 15:19

## 2017-09-12 RX ADMIN — SODIUM CHLORIDE: 9 INJECTION, SOLUTION INTRAVENOUS at 20:05

## 2017-09-12 RX ADMIN — ONDANSETRON 4 MG: 2 INJECTION INTRAMUSCULAR; INTRAVENOUS at 11:20

## 2017-09-12 RX ADMIN — PROPOFOL 200 MG: 10 INJECTION, EMULSION INTRAVENOUS at 15:19

## 2017-09-12 RX ADMIN — GLYCOPYRROLATE 0.8 MG: 0.2 INJECTION, SOLUTION INTRAMUSCULAR; INTRAVENOUS at 15:30

## 2017-09-12 RX ADMIN — HYDROMORPHONE HYDROCHLORIDE 0.5 MG: 1 INJECTION, SOLUTION INTRAMUSCULAR; INTRAVENOUS; SUBCUTANEOUS at 04:43

## 2017-09-12 RX ADMIN — SODIUM CHLORIDE: 9 INJECTION, SOLUTION INTRAVENOUS at 06:10

## 2017-09-12 RX ADMIN — SODIUM CHLORIDE, POTASSIUM CHLORIDE, SODIUM LACTATE AND CALCIUM CHLORIDE: 600; 310; 30; 20 INJECTION, SOLUTION INTRAVENOUS at 16:31

## 2017-09-12 RX ADMIN — SODIUM CHLORIDE, POTASSIUM CHLORIDE, SODIUM LACTATE AND CALCIUM CHLORIDE: 600; 310; 30; 20 INJECTION, SOLUTION INTRAVENOUS at 14:55

## 2017-09-12 RX ADMIN — DEXAMETHASONE SODIUM PHOSPHATE 4 MG: 4 INJECTION, SOLUTION INTRA-ARTICULAR; INTRALESIONAL; INTRAMUSCULAR; INTRAVENOUS; SOFT TISSUE at 15:20

## 2017-09-12 NOTE — PROGRESS NOTES
PRIMARY DIAGNOSIS: ACUTE PAIN  OUTPATIENT/OBSERVATION GOALS TO BE MET BEFORE DISCHARGE:  1. Pain Status: No improvement noted. Consider adjustment in pain regimen.    2. Return to near baseline physical activity: No    3. Cleared for discharge by consultants (if involved): No    Discharge Planner Nurse   Safe discharge environment identified: Yes  Barriers to discharge: Yes       Entered by: Stephanie Guevara 09/11/2017 9:41 PM     Please review provider order for any additional goals.   Nurse to notify provider when observation goals have been met and patient is ready for discharge.

## 2017-09-12 NOTE — PLAN OF CARE
Problem: Goal Outcome Summary  Goal: Goal Outcome Summary  Outcome: No Change  VSS. Pt admitted with abdominal pain.  Pt rates lower abdominal pain 6/10, given dilaudid x2 becomes instantly nauseous with pain medication.  Premedicated before pain meds with no issues.  Given zofran and compazine x1.  Bowel sounds hypoactive, not passing gas.  GI and surgery consulted. Pt left for ERCP around 1300.

## 2017-09-12 NOTE — PLAN OF CARE
Problem: Individualization  Goal: Patient Preferences  Outcome: No Change  Continues to have intermittent pain/nausea. Emesis x1 immediately following IV dilaudid. IV narcotics x2. SBA to BR. Voiding well. Continues on Unasyn & NS @200. NPO for surgical and GI consults today. Handouts given on Unasyn, Lap Sana & ERCP procedures.

## 2017-09-12 NOTE — CONSULTS
"GASTROENTEROLOGY CONSULTATION      Edward Szymanski  [address] [facility] [address]  47 year old male     Admission Date/Time: 9/11/2017  Primary Care Provider: Darren Henderson  Referring / Attending Physician:  Dr. Conner     We were asked to see the patient in consultation by Dr. Conner for evaluation of choldedocholithiasis.        HPI:  Edward Szymanski is a 47 year old male who is admitted with epigastric abdominal pain. He reports having \"attacks\" after eating for the last 2 years. Reports previous pancreatitis episode 2 years ago that resolved with conservative management- denies previous surgery or endoscopies. States he was to get gall bladder removed but did not complete this and managed with diet changes. Since that time was eating healthy and attacks were infrequent and would only last about 30 minutes after eating. Since July, he has been having increased pain after eating and it would last hours. He has had increasing pain since Thursday last week. Seen in ER on Friday and discharged home with pain meds. He ate breakfast yesterday and pain became severe, 10/10 pain. Pain is located primarily in epigastric area but radiates to RUQ/LUQ and to back. +Nausea, denies vomiting, fevers or chills. He drinks socially, 1 beer about once per week. No new medications. Has been taking occasional curcumin supplement.        PAST MEDICAL HISTORY:  Patient Active Problem List    Diagnosis Date Noted     Choledocholithiasis 09/11/2017     Priority: Medium     Pancreatitis 09/11/2017     Priority: Medium     Generalized anxiety disorder 09/02/2011     Priority: Medium     HYPERLIPIDEMIA LDL GOAL <130 10/31/2010     Priority: Medium          ROS: A comprehensive ten point review of systems was negative aside from those in mentioned in the HPI.       MEDICATIONS:   Prior to Admission medications    Medication Sig Start Date End Date Taking? Authorizing Provider   cetirizine (ZYRTEC) 10 MG tablet Take 10 mg by mouth " daily as needed for allergies   Yes Unknown, Entered By History   budesonide (RHINOCORT ALLERGY) 32 MCG/ACT spray Spray 1 spray into both nostrils daily as needed for rhinitis or allergies   Yes Unknown, Entered By History   ibuprofen (ADVIL/MOTRIN) 200 MG tablet Take 400 mg by mouth 3 times daily as needed for mild pain   Yes Unknown, Entered By History   HYDROcodone-acetaminophen (NORCO) 5-325 MG per tablet Take 1 tablet by mouth every 6 hours as needed for moderate to severe pain 9/9/17  Yes Mike Pitts MD        ALLERGIES: No Known Allergies     SOCIAL HISTORY:  Social History   Substance Use Topics     Smoking status: Never Smoker     Smokeless tobacco: Never Used     Alcohol use Yes      Comment: Socially        FAMILY HISTORY:  Family History   Problem Relation Age of Onset     Neurologic Disorder Father      Muscular dystrophy     Family History Negative Mother      Family History Negative Sister      Family History Negative Sister    niece with gall bladder issues     PHYSICAL EXAM:     BP (!) 154/93 (BP Location: Left arm)  Pulse 105  Temp 97.9  F (36.6  C) (Oral)  Resp 16  SpO2 93%     PHYSICAL EXAM:  GENERAL: No acute distress  SKIN: no suspicious lesions, rashes, jaundice, or spider angiomas  HEAD: Normocephalic. Atraumatic.  NECK: Neck supple. No adenopathy.   EYES: +scleral icterus  ENT: ENT exam normal, no neck nodes or sinus tenderness  RESPIRATORY: Good transmission. CTA bilaterally.   CARDIOVASCULAR: RRR, normal S1, S2,  No murmur appreciated  GASTROINTESTINAL: +BS, soft, tenderness diffusely across upper abdomen, non distended, no hepatosplenomegaly, no masses/guarding/rebound  JOINT/EXTREMITIES:  no gross deformities noted, normal muscle tone  NEURO: CN 2-12 grossly intact, no focal deficits  PSYCH: Normal affect              ADDITIONAL COMMENTS:   I reviewed the patient's new clinical lab test results.   Recent Labs   Lab Test  09/12/17   0640  09/11/17   1600  09/09/17   0029    WBC  11.8*  13.5*  8.8   HGB  15.4  16.8  16.9   MCV  84  83  83   PLT  273  268  296     Recent Labs   Lab Test  09/12/17   0640  09/11/17   1600  09/09/17   0029   POTASSIUM  3.8  3.9  4.1   CHLORIDE  107  101  103   CO2  25  28  28   BUN  10  10  17   ANIONGAP  6  8  8     Recent Labs   Lab Test  09/12/17   0640  09/11/17   1600  09/09/17   0029  06/01/15   1057  05/19/15   0839   ALBUMIN  3.1*  3.8  3.9  4.2  4.1   BILITOTAL  5.5*  5.7*  0.6  0.8  0.8   ALT  434*  520*  60  39  47   AST  151*  176*  62*  18  13   LIPASE   --   45059*  135  154  147   AMYLASE   --    --    --   66  57        IMAGING / ENDOSCOPY   RIGHT UPPER QUADRANT ULTRASOUND  9/11/2017  4:35 PM     HISTORY: Right upper quadrant/epigastric pain.     COMPARISON: None.     FINDINGS:   Gallbladder: Multiple gallstones and sludge. Negative sonographic  Adam's sign, although patient has taken pain medication. There is  curvilinear echogenicity at the gallbladder fundus which could be  calcification of the wall of the gallbladder fundus or a stone against  the fundal portion of the gallbladder.     Bile ducts: 0.8 cm common hepatic duct is dilated.      Liver: No focal liver lesions identified.     Pancreas: Pancreatic head and body grossly normal but not optimally  visualized. Pancreatic tail obscured.     Right kidney: Normal 11.8 cm.         IMPRESSION: Cholelithiasis and gallbladder sludge, dilated 0.8 cm  common hepatic duct. Gallbladder mildly distended 12 cm in length.  Borderline gallbladder wall thickening 0.4 cm. Cannot rule out early  cholecystitis, although patient took pain medication and sonographic  Adam's sign is negative.          CONSULTATION ASSESSMENT AND PLAN:    Edward Szymanski is a 47 year old admitted with abdominal pain. Labs with elevated LFTs and lipase 35492, US with cholelithiasis and sludge, dilated CBD. Findings consistent with gall stone pancreatitis. WBC elevated, on abx. Case discussed with Dr. James, biliary  MD, plan for ERCP this afternoon pending OR schedule. Surgery is following  -NPO  -Continue IVF and pain management  -ERCP      Thank you for asking us to participate in the care of this patient.    Tamara Dela Cruz PA-C  [facility]

## 2017-09-12 NOTE — ANESTHESIA PREPROCEDURE EVALUATION
Anesthesia Evaluation     . Pt has had prior anesthetic. Type: MAC           ROS/MED HX    ENT/Pulmonary:  - neg pulmonary ROS     Neurologic:  - neg neurologic ROS     Cardiovascular:  - neg cardiovascular ROS       METS/Exercise Tolerance:     Hematologic:  - neg hematologic  ROS       Musculoskeletal:  - neg musculoskeletal ROS       GI/Hepatic:     (+) cholecystitis/cholelithiasis,       Renal/Genitourinary:  - ROS Renal section negative       Endo:  - neg endo ROS       Psychiatric:  - neg psychiatric ROS       Infectious Disease:  - neg infectious disease ROS       Malignancy:      - no malignancy   Other:    (+) No chance of pregnancy C-spine cleared: N/A, no H/O Chronic Pain,no other significant disability                    Physical Exam  Normal systems: cardiovascular, pulmonary and dental    Airway   Mallampati: II  TM distance: >3 FB  Neck ROM: full    Dental     Cardiovascular       Pulmonary                     Anesthesia Plan      History & Physical Review  History and physical reviewed and following examination; no interval change.    ASA Status:  2 .    NPO Status:  > 8 hours    Plan for General with Intravenous induction. Maintenance will be Balanced.    PONV prophylaxis:  Ondansetron (or other 5HT-3) and Dexamethasone or Solumedrol       Postoperative Care  Postoperative pain management:  IV analgesics.      Consents  Anesthetic plan, risks, benefits and alternatives discussed with:  Patient.  Use of blood products discussed: Yes.   Use of blood products discussed with Patient.  Consented to blood products.  .                          .

## 2017-09-12 NOTE — PROGRESS NOTES
PRIMARY DIAGNOSIS: ACUTE PAIN  OUTPATIENT/OBSERVATION GOALS TO BE MET BEFORE DISCHARGE:  1. Pain Status: Improved but still requiring IV narcotics.    2. Return to near baseline physical activity: Yes    3. Cleared for discharge by consultants (if involved): No    Discharge Planner Nurse   Safe discharge environment identified: Yes  Barriers to discharge: Yes       Entered by: Corry Frazier 09/12/2017 3:17 AM     Please review provider order for any additional goals.   Nurse to notify provider when observation goals have been met and patient is ready for discharge.      Pain intermittent, requires IV narcotics. Also c/o nausea and had emesis x1.

## 2017-09-12 NOTE — ANESTHESIA CARE TRANSFER NOTE
Patient: Edward Szymanski    Procedure(s):  ENDOSCOPIC RETROGRADE CHOLANGIOPANCREATOGRAM with sphincterotomy and stone extraction - Wound Class: II-Clean Contaminated    Diagnosis: gallstone pancreatitis  Diagnosis Additional Information: No value filed.    Anesthesia Type:   General     Note:  Airway :Face Mask  Patient transferred to:PACU  Comments: Pt SV good tidal volume, op sxn cuff down extubated.  Transfer to pacu.  Report to PACU RN transfer care.       Vitals: (Last set prior to Anesthesia Care Transfer)    CRNA VITALS  9/12/2017 1524 - 9/12/2017 1603      9/12/2017             Pulse: 125    SpO2: 100 %    Resp Rate (observed): 26                Electronically Signed By: ALISHA Cruz CRNA  September 12, 2017  4:03 PM

## 2017-09-12 NOTE — PROGRESS NOTES
PRIMARY DIAGNOSIS: ACUTE PAIN  OUTPATIENT/OBSERVATION GOALS TO BE MET BEFORE DISCHARGE:  1. Pain Status: Improved but still requiring IV narcotics.    2. Return to near baseline physical activity: No    3. Cleared for discharge by consultants (if involved): No    Discharge Planner Nurse   Safe discharge environment identified: Yes  Barriers to discharge: Yes       Entered by: Stephanie Guevara 09/11/2017 11:34 PM     Please review provider order for any additional goals.   Nurse to notify provider when observation goals have been met and patient is ready for discharge.     Patient was given both Zofran and compazine during this shift. IV dilaudid last given at 9pm. He would like to continue to have it Q2 hours. Continuous fluids are running and patient is tolerating IV  NS. Continue plan of care.

## 2017-09-12 NOTE — CONSULTS
General Surgery Consultation    Edward Nessa MRN# [id number]   Age: 47 year old YOB: 1970     Date of Admission:  9/11/2017    Reason for consult:            Abdominal pain, epigastric       Requesting physician:            Ubaldo                Assessment and Plan:   Assessment:   Choledocholithiasis with pancreatitis, CBD obstruction  cholecystitis      Plan:   GI consulted for ERCP/ duct clearance  Lap edgar after above - discussed with patient and teaching material ordered.   Discussed lap edgar - inc/scar, conversion, duct/organ injury, recovery, post edgar Sx and Rx. All questions answered, teaching literature ordered.                  Chief Complaint:   Abdominal pain, epigastric     History is obtained from the patient and electronic health record         History of Present Illness:   This patient is a 47 year old  male without a significant past medical history who presents with epigastric pain. First attack 2 years ago, managed well with diet till July when attacks recurred and became more intense and long lasting. He reports pain is being controlled with meds.          Past Medical History:    has a past medical history of Allergic rhinitis; High cholesterol; and Hyperlipidemia.          Past Surgical History:     Past Surgical History:   Procedure Laterality Date     NO HISTORY OF SURGERY               Social History:     Social History   Substance Use Topics     Smoking status: Never Smoker     Smokeless tobacco: Never Used     Alcohol use Yes      Comment: Socially             Family History:   This patient has no significant family history, GB disease only in a neice          Allergies:   No Known Allergies          Medications:     No current facility-administered medications on file prior to encounter.   Current Outpatient Prescriptions on File Prior to Encounter:  HYDROcodone-acetaminophen (NORCO) 5-325 MG per tablet Take 1 tablet by mouth every 6 hours as needed for  moderate to severe pain       ampicillin-sulbactam (UNASYN) IV  3 g Intravenous Q6H            Review of Systems:   The Review of Systems is negative other than noted in the HPI          Physical Exam:   Gen:  This is a well developed, well nourished male in no apparent distress.  Blood pressure 146/89, pulse 89, temperature 98  F (36.7  C), temperature source Oral, resp. rate 16, SpO2 96 %.  HEENT - Normocephalic, atraumatic, mucous membranes nl.  pos scleral icterus.  Neck - supple without masses  Lungs - clear to ascultation.    Heart - Regular rate & rhythm without murmur  Abdomen:   soft, non-distended, tenderness noted in the epigastric region and no masses palpated   Extremities - warm without edema  Neurologic - nonfocal          Data:   WBC -   WBC   Date Value Ref Range Status   09/11/2017 13.5 (H) 4.0 - 11.0 10e9/L Final   ], HgB - Hemoglobin   Date Value Ref Range Status   09/11/2017 16.8 13.3 - 17.7 g/dL Final   ]   Liver Function Studies -   Recent Labs   Lab Test  09/11/17   1600   PROTTOTAL  7.7   ALBUMIN  3.8   BILITOTAL  5.7*   ALKPHOS  243*   AST  176*   ALT  520*       Gallbladder ultrasound:   Common bile duct is enlarged  Common bile duct measures  0.8 cm (was 0.5 on prior US)  Gallstones seen  Gallbladder wall thickening to 0.4 cm        Demond Zhou MD

## 2017-09-12 NOTE — PROGRESS NOTES
[facility]    Hospitalist Progress Note  Name: Edward Szymanski    MRN: [id number]  Provider:  Mandeep Hernandez DO, FHM (Text Page)    Assessment & Plan   Summary of Stay: Edward Szymanski is a 47 year old male who was admitted on 9/11/2017.  He has had on and off RUQ abdominal complaints for quite some time that recently worsened.  He was seen in the ER 9/9 and then returned 9/11 feeling worse.    1.  Probable acute choledocholithiasis with associated pancreatitis, prior history consistent with recurrent GB complaints:  -  NPO  -  IVF  -  Pain meds  -  GI consult today for obstruction (not definite on US but history/labs consistent with obstruction)  -  Appreciate surgery consult for eventual cholecystectomy (patient understands we need to deal with obstruction first)  -  Empiric unasyn  -  He denies baseline major cardiac/pulmonary problems/complaints.  He has only had anesthesia with wisdom teeth removal but did well with that.  He appears reasonably optimized for this semi urgent procedure.    DVT Prophylaxis: Pneumatic Compression Devices  Code Status: Full Code    Disposition Plan   Expected discharge unclear, likely 2+ days to prior living arrangement once pancreatitis resolves.     Entered: Mandeep Hernandez 09/12/2017, 8:02 AM       Interval History   Assumed care, history reviewed.  Mr. Szymanski's pain is controlled but still present central abdomen.  No chest pain, sob, other new complaints.    -Data reviewed today: I reviewed all new labs and imaging reports over the last 24 hours. I personally reviewed no images or EKG's today.    Physical Exam   Temp: 98  F (36.7  C) Temp src: Oral BP: 146/89 Pulse: 89   Resp: 16 SpO2: 96 % O2 Device: None (Room air)    There were no vitals filed for this visit.  Vital Signs with Ranges  Temp:  [97.5  F (36.4  C)-98.7  F (37.1  C)] 98  F (36.7  C)  Pulse:  [68-94] 89  Resp:  [16-20] 16  BP: (130-148)/() 146/89  SpO2:  [95 %-97 %] 96 %  I/O last 3 completed  shifts:  In: 1168 [I.V.:1168]  Out: -     GEN:  Alert, oriented x 3, appears ill but comfortable.  HEENT:  Normocephalic/atraumatic, no scleral icterus, no nasal discharge, mouth moist.  CV:  Regular rate and rhythm, no murmur or JVD.  S1 + S2 noted, no S3 or S4.  LUNGS:  Clear to auscultation anterior/laterally without rales/rhonchi/wheezing/retractions.  Breath sounds mildly diminished toward bases.  Symmetric chest rise on inhalation noted.  ABD:  Hypoactive bowel sounds, soft, tender central ABD, mildly distended.  No rebound/rigidity.  EXT:  No edema.  No cyanosis.  No acute joint synovitis noted.  SKIN:  Dry to touch, no exanthems noted in the visualized areas.    Medications     NaCl 200 mL/hr at 09/12/17 0610       ampicillin-sulbactam (UNASYN) IV  3 g Intravenous Q6H     Data       Recent Labs  Lab 09/12/17  0640 09/11/17  1600 09/09/17  0029   WBC 11.8* 13.5* 8.8   HGB 15.4 16.8 16.9   HCT 46.3 49.0 50.1   MCV 84 83 83    268 296       Recent Labs  Lab 09/12/17  0640 09/11/17  1600 09/09/17  0029    137 139   POTASSIUM 3.8 3.9 4.1   CHLORIDE 107 101 103   CO2 25 28 28   ANIONGAP 6 8 8   * 135* 100*   BUN 10 10 17   CR 0.69 0.82 1.06   GFRESTIMATED >90 >90 75   GFRESTBLACK >90 >90 >90   MONICA 8.2* 9.3 9.2   PROTTOTAL 6.4* 7.7 8.1   ALBUMIN 3.1* 3.8 3.9   BILITOTAL 5.5* 5.7* 0.6   ALKPHOS 231* 243* 78   * 176* 62*   * 520* 60       Recent Labs  Lab 09/11/17  1600 09/09/17  0029   LIPASE 71649* 135         Recent Results (from the past 24 hour(s))   Abdomen US, limited (RUQ only)    Narrative    RIGHT UPPER QUADRANT ULTRASOUND  9/11/2017  4:35 PM    HISTORY: Right upper quadrant/epigastric pain.    COMPARISON: None.    FINDINGS:   Gallbladder: Multiple gallstones and sludge. Negative sonographic  Adam's sign, although patient has taken pain medication. There is  curvilinear echogenicity at the gallbladder fundus which could be  calcification of the wall of the gallbladder  fundus or a stone against  the fundal portion of the gallbladder.    Bile ducts: 0.8 cm common hepatic duct is dilated.     Liver: No focal liver lesions identified.    Pancreas: Pancreatic head and body grossly normal but not optimally  visualized. Pancreatic tail obscured.    Right kidney: Normal 11.8 cm.      Impression    IMPRESSION: Cholelithiasis and gallbladder sludge, dilated 0.8 cm  common hepatic duct. Gallbladder mildly distended 12 cm in length.  Borderline gallbladder wall thickening 0.4 cm. Cannot rule out early  cholecystitis, although patient took pain medication and sonographic  Adam's sign is negative.    YESI DALE MD

## 2017-09-12 NOTE — ANESTHESIA POSTPROCEDURE EVALUATION
Patient: Edward Szymanski    Procedure(s):  ENDOSCOPIC RETROGRADE CHOLANGIOPANCREATOGRAM with sphincterotomy and stone extraction - Wound Class: II-Clean Contaminated    Diagnosis:gallstone pancreatitis  Diagnosis Additional Information: gallstone pancreatitis    Anesthesia Type:  General    Note:  Anesthesia Post Evaluation    Patient location during evaluation: PACU  Patient participation: Able to fully participate in evaluation  Level of consciousness: awake and alert  Pain management: adequate  Airway patency: patent  Cardiovascular status: acceptable  Respiratory status: acceptable  Hydration status: acceptable  PONV: controlled     Anesthetic complications: None          Last vitals:  Vitals:    09/12/17 1620 09/12/17 1625 09/12/17 1630   BP: (!) 155/104 (!) 153/105 (!) 153/98   Pulse:      Resp: 17 17 10   Temp:      SpO2: 100% 97% 96%         Electronically Signed By: Donnell Andujar MD  September 12, 2017  4:38 PM

## 2017-09-13 ENCOUNTER — APPOINTMENT (OUTPATIENT)
Dept: CT IMAGING | Facility: CLINIC | Age: 47
DRG: 444 | End: 2017-09-13
Attending: PHYSICIAN ASSISTANT
Payer: COMMERCIAL

## 2017-09-13 LAB
ALBUMIN SERPL-MCNC: 2.7 G/DL (ref 3.4–5)
ALBUMIN SERPL-MCNC: 3 G/DL (ref 3.4–5)
ALP SERPL-CCNC: 201 U/L (ref 40–150)
ALP SERPL-CCNC: 234 U/L (ref 40–150)
ALT SERPL W P-5'-P-CCNC: 296 U/L (ref 0–70)
ALT SERPL W P-5'-P-CCNC: 359 U/L (ref 0–70)
ANION GAP SERPL CALCULATED.3IONS-SCNC: 7 MMOL/L (ref 3–14)
ANION GAP SERPL CALCULATED.3IONS-SCNC: 8 MMOL/L (ref 3–14)
AST SERPL W P-5'-P-CCNC: 65 U/L (ref 0–45)
AST SERPL W P-5'-P-CCNC: 88 U/L (ref 0–45)
BILIRUB SERPL-MCNC: 2.5 MG/DL (ref 0.2–1.3)
BILIRUB SERPL-MCNC: 2.9 MG/DL (ref 0.2–1.3)
BUN SERPL-MCNC: 13 MG/DL (ref 7–30)
BUN SERPL-MCNC: 13 MG/DL (ref 7–30)
CALCIUM SERPL-MCNC: 8.4 MG/DL (ref 8.5–10.1)
CALCIUM SERPL-MCNC: 8.6 MG/DL (ref 8.5–10.1)
CHLORIDE SERPL-SCNC: 105 MMOL/L (ref 94–109)
CHLORIDE SERPL-SCNC: 106 MMOL/L (ref 94–109)
CO2 SERPL-SCNC: 25 MMOL/L (ref 20–32)
CO2 SERPL-SCNC: 26 MMOL/L (ref 20–32)
CREAT SERPL-MCNC: 0.72 MG/DL (ref 0.66–1.25)
CREAT SERPL-MCNC: 0.78 MG/DL (ref 0.66–1.25)
ERYTHROCYTE [DISTWIDTH] IN BLOOD BY AUTOMATED COUNT: 13.3 % (ref 10–15)
GFR SERPL CREATININE-BSD FRML MDRD: >90 ML/MIN/1.7M2
GFR SERPL CREATININE-BSD FRML MDRD: >90 ML/MIN/1.7M2
GLUCOSE SERPL-MCNC: 90 MG/DL (ref 70–99)
GLUCOSE SERPL-MCNC: 92 MG/DL (ref 70–99)
HCT VFR BLD AUTO: 42.5 % (ref 40–53)
HGB BLD-MCNC: 14.3 G/DL (ref 13.3–17.7)
LACTATE BLD-SCNC: 1.1 MMOL/L (ref 0.7–2)
LIPASE SERPL-CCNC: 3491 U/L (ref 73–393)
MCH RBC QN AUTO: 28 PG (ref 26.5–33)
MCHC RBC AUTO-ENTMCNC: 33.6 G/DL (ref 31.5–36.5)
MCV RBC AUTO: 83 FL (ref 78–100)
PLATELET # BLD AUTO: 279 10E9/L (ref 150–450)
POTASSIUM SERPL-SCNC: 3.9 MMOL/L (ref 3.4–5.3)
POTASSIUM SERPL-SCNC: 4.3 MMOL/L (ref 3.4–5.3)
PROT SERPL-MCNC: 6.2 G/DL (ref 6.8–8.8)
PROT SERPL-MCNC: 6.7 G/DL (ref 6.8–8.8)
RBC # BLD AUTO: 5.1 10E12/L (ref 4.4–5.9)
SODIUM SERPL-SCNC: 138 MMOL/L (ref 133–144)
SODIUM SERPL-SCNC: 139 MMOL/L (ref 133–144)
WBC # BLD AUTO: 19.5 10E9/L (ref 4–11)

## 2017-09-13 PROCEDURE — 83690 ASSAY OF LIPASE: CPT | Performed by: INTERNAL MEDICINE

## 2017-09-13 PROCEDURE — 85027 COMPLETE CBC AUTOMATED: CPT | Performed by: INTERNAL MEDICINE

## 2017-09-13 PROCEDURE — 99232 SBSQ HOSP IP/OBS MODERATE 35: CPT | Performed by: INTERNAL MEDICINE

## 2017-09-13 PROCEDURE — 83605 ASSAY OF LACTIC ACID: CPT | Performed by: INTERNAL MEDICINE

## 2017-09-13 PROCEDURE — 74177 CT ABD & PELVIS W/CONTRAST: CPT

## 2017-09-13 PROCEDURE — 25000128 H RX IP 250 OP 636: Performed by: INTERNAL MEDICINE

## 2017-09-13 PROCEDURE — 80053 COMPREHEN METABOLIC PANEL: CPT | Performed by: INTERNAL MEDICINE

## 2017-09-13 PROCEDURE — 99207 ZZC CDG-MDM COMPONENT: MEETS LOW - DOWN CODED: CPT | Performed by: INTERNAL MEDICINE

## 2017-09-13 PROCEDURE — 36415 COLL VENOUS BLD VENIPUNCTURE: CPT | Performed by: INTERNAL MEDICINE

## 2017-09-13 PROCEDURE — 12000007 ZZH R&B INTERMEDIATE

## 2017-09-13 PROCEDURE — 99232 SBSQ HOSP IP/OBS MODERATE 35: CPT | Performed by: SURGERY

## 2017-09-13 RX ORDER — ACETAMINOPHEN 10 MG/ML
1000 INJECTION, SOLUTION INTRAVENOUS EVERY 8 HOURS PRN
Status: DISCONTINUED | OUTPATIENT
Start: 2017-09-13 | End: 2017-09-13

## 2017-09-13 RX ORDER — ACETAMINOPHEN 10 MG/ML
1000 INJECTION, SOLUTION INTRAVENOUS EVERY 12 HOURS PRN
Status: DISCONTINUED | OUTPATIENT
Start: 2017-09-13 | End: 2017-09-16 | Stop reason: HOSPADM

## 2017-09-13 RX ORDER — IOPAMIDOL 755 MG/ML
500 INJECTION, SOLUTION INTRAVASCULAR ONCE
Status: COMPLETED | OUTPATIENT
Start: 2017-09-13 | End: 2017-09-13

## 2017-09-13 RX ADMIN — HYDROMORPHONE HYDROCHLORIDE 0.5 MG: 1 INJECTION, SOLUTION INTRAMUSCULAR; INTRAVENOUS; SUBCUTANEOUS at 18:46

## 2017-09-13 RX ADMIN — ONDANSETRON 4 MG: 2 INJECTION INTRAMUSCULAR; INTRAVENOUS at 16:38

## 2017-09-13 RX ADMIN — AMPICILLIN SODIUM AND SULBACTAM SODIUM 3 G: 2; 1 INJECTION, POWDER, FOR SOLUTION INTRAMUSCULAR; INTRAVENOUS at 06:42

## 2017-09-13 RX ADMIN — PROCHLORPERAZINE EDISYLATE 5 MG: 5 INJECTION INTRAMUSCULAR; INTRAVENOUS at 22:55

## 2017-09-13 RX ADMIN — SODIUM CHLORIDE: 9 INJECTION, SOLUTION INTRAVENOUS at 14:43

## 2017-09-13 RX ADMIN — HYDROMORPHONE HYDROCHLORIDE 0.5 MG: 1 INJECTION, SOLUTION INTRAMUSCULAR; INTRAVENOUS; SUBCUTANEOUS at 16:38

## 2017-09-13 RX ADMIN — AMPICILLIN SODIUM AND SULBACTAM SODIUM 3 G: 2; 1 INJECTION, POWDER, FOR SOLUTION INTRAMUSCULAR; INTRAVENOUS at 18:05

## 2017-09-13 RX ADMIN — AMPICILLIN SODIUM AND SULBACTAM SODIUM 3 G: 2; 1 INJECTION, POWDER, FOR SOLUTION INTRAMUSCULAR; INTRAVENOUS at 12:00

## 2017-09-13 RX ADMIN — ACETAMINOPHEN 1000 MG: 10 INJECTION, SOLUTION INTRAVENOUS at 20:46

## 2017-09-13 RX ADMIN — SODIUM CHLORIDE 65 ML: 9 INJECTION, SOLUTION INTRAVENOUS at 13:03

## 2017-09-13 RX ADMIN — IOPAMIDOL 100 ML: 755 INJECTION, SOLUTION INTRAVENOUS at 13:03

## 2017-09-13 RX ADMIN — AMPICILLIN SODIUM AND SULBACTAM SODIUM 3 G: 2; 1 INJECTION, POWDER, FOR SOLUTION INTRAMUSCULAR; INTRAVENOUS at 00:44

## 2017-09-13 NOTE — PROGRESS NOTES
"[facility]  General Surgery Progress Note           Assessment and Plan:   Assessment:   Choledocholithiasis - resolved with ERCP  Probable residual pancreatitis, ? Mild ileus  cholelithiasis      Plan:   -await full recovery from pancreatitis, plan elective lap edgar with cholangiogram in a few weeks         Interval History:   Pressure in epigastrium, stone removed at ERCP         Physical Exam:   Blood pressure 134/90, pulse 105, temperature 98.8  F (37.1  C), temperature source Oral, resp. rate 16, height 1.803 m (5' 10.98\"), SpO2 95 %.    I/O last 3 completed shifts:  In: 3620 [P.O.:30; I.V.:3590]  Out: -     Abdomen:   firm, distended, tenderness noted in the epigastric region, voluntary guarding absent and no masses palpated               Data:     Recent Labs   Lab Test  09/13/17   0648  09/12/17   0640  09/11/17   1600   HGB  14.3  15.4  16.8   WBC  19.5*  11.8*  13.5*         Recent Labs  Lab 09/13/17  0648 09/12/17  0640 09/11/17  1600   AST 88* 151* 176*   * 434* 520*   ALKPHOS 234* 231* 243*   BILITOTAL 2.9* 5.5* 5.7*       Recent Labs  Lab 09/13/17  0648 09/11/17  1600 09/09/17  0029   LIPASE 3491* 39079* 135       Demond Zhou MD     "

## 2017-09-13 NOTE — PLAN OF CARE
Problem: Goal Outcome Summary  Goal: Goal Outcome Summary  Outcome: No Change  Patient alert and oriented x4.  VSS.  Up independently in room.  NPO w/ ice chips.  Continues on Unasyn.  CT of abdomen done.  C/O pain 7/10 but refused pain meds.  Voiding well.  GI and surgery following.

## 2017-09-13 NOTE — PROGRESS NOTES
"GASTROENTEROLOGY PROGRESS NOTE       SUBJECTIVE: Pt states he felt better yesterday afternoon but worse this AM. Having significant pain in upper abdomen. Now with some sweats, temp 99. Has been reluctant to take pain meds because he wants to monitor pain and also avoid constipation. Discussed options.      OBJECTIVE:    /88 (BP Location: Left arm)  Pulse 103  Temp 99.3  F (37.4  C) (Oral)  Resp 16  Ht 1.803 m (5' 10.98\")  SpO2 94%  Temp (24hrs), Av  F (36.7  C), Min:95.2  F (35.1  C), Max:99.3  F (37.4  C)    No data found.      Intake/Output Summary (Last 24 hours) at 17 0935  Last data filed at 17 0533   Gross per 24 hour   Intake             3620 ml   Output                0 ml   Net             3620 ml        PHYSICAL EXAM    Constitutional: no acute distress  Cardiovascular: RRR, normal S1, S2, no murmur appreciated   Respiratory: good transmission, CTAB  Abdomen: distended, tympanic, +bowel sounds, diffuse TTP most significant in epigastric area  NEURO: CN 2-12 grossly intact, no focal deficits  SKIN: No jaundice         Additional Comments:  ROS, FH, SH: See initial GI consult for details.     I have reviewed the patient's new clinical lab results:     Recent Labs   Lab Test  1748  17   0640  17   1600   WBC  19.5*  11.8*  13.5*   HGB  14.3  15.4  16.8   MCV  83  84  83   PLT  279  273  268     Recent Labs   Lab Test  1748  17   0640  17   1600   POTASSIUM  3.9  3.8  3.9   CHLORIDE  106  107  101   CO2  25  25  28   BUN  13  10  10   ANIONGAP  8  6  8     Recent Labs   Lab Test  17   0648  17   0640  17   1600  17   0029  06/01/15   1057  05/19/15   0839   ALBUMIN  3.0*  3.1*  3.8  3.9  4.2  4.1   BILITOTAL  2.9*  5.5*  5.7*  0.6  0.8  0.8   ALT  359*  434*  520*  60  39  47   AST  88*  151*  176*  62*  18  13   LIPASE  3491*   --   00087*  135  154  147   AMYLASE   --    --    --    --   66  57     ERCP " 9/12/17  Impression:               - - Choledocholithiasis was found. Complete removal                             was accomplished by biliary sphincterotomy and                             balloon extraction.                             -   Recommendation:           - Return patient to [facility] for ongoing care.                             - Clear liquid diet today.                             - Elective cholecystectomy per surgery       RIGHT UPPER QUADRANT ULTRASOUND  9/11/2017  4:35 PM      HISTORY: Right upper quadrant/epigastric pain.      COMPARISON: None.      FINDINGS:   Gallbladder: Multiple gallstones and sludge. Negative sonographic  Adam's sign, although patient has taken pain medication. There is  curvilinear echogenicity at the gallbladder fundus which could be  calcification of the wall of the gallbladder fundus or a stone against  the fundal portion of the gallbladder.      Bile ducts: 0.8 cm common hepatic duct is dilated.       Liver: No focal liver lesions identified.      Pancreas: Pancreatic head and body grossly normal but not optimally  visualized. Pancreatic tail obscured.      Right kidney: Normal 11.8 cm.          IMPRESSION: Cholelithiasis and gallbladder sludge, dilated 0.8 cm  common hepatic duct. Gallbladder mildly distended 12 cm in length.  Borderline gallbladder wall thickening 0.4 cm. Cannot rule out early  cholecystitis, although patient took pain medication and sonographic  Adam's sign is negative.     ASSESSMENT/ PLAN  46 yo male admitted with gall stone pancreatitis. ERCP successful yesterday with stone removal and sphincterotomy. LFTs improving but pain worse today with leukocytosis.  -Remain NPO, continue IVF and pain management  -CT to further evaluate for pancreatitis complication  -Bowel sounds present, can monitor for ileus and consider laxative if needed such as supplement    Tamara Dela Cruz PA-C  [facility]

## 2017-09-13 NOTE — PROGRESS NOTES
PRIMARY DIAGNOSIS: ACUTE PAIN  OUTPATIENT/OBSERVATION GOALS TO BE MET BEFORE DISCHARGE:  1. Pain Status: Improved but still requiring IV narcotics.    2. Return to near baseline physical activity: Yes    3. Cleared for discharge by consultants (if involved): No    Discharge Planner Nurse   Safe discharge environment identified: Yes  Barriers to discharge: Yes       Entered by: Stephanie Guevara 09/12/2017 9:20 PM     Please review provider order for any additional goals.   Nurse to notify provider when observation goals have been met and patient is ready for discharge. Patient currently is NPO. Pain is controlled during this shift and at this time has not needed IV pain medication or nausea medication. Up with SBA to bathroom post ERCP. Continue to monitor.

## 2017-09-13 NOTE — PLAN OF CARE
Problem: Goal Outcome Summary  Goal: Goal Outcome Summary  Outcome: No Change  VSS. Pt reports 5/10 abdominal pain, declined meds, tried ambulating around the room x2. NPO + ice chips, BS faint, denies nausea, not passing gas. Pt up independently, voiding adequately, but urine dark orange in color. Recheck labs today.

## 2017-09-13 NOTE — PROGRESS NOTES
PRIMARY DIAGNOSIS: ACUTE PAIN  OUTPATIENT/OBSERVATION GOALS TO BE MET BEFORE DISCHARGE:  1. Pain Status: Improved but still requiring IV narcotics.     2. Return to near baseline physical activity: Yes    3. Cleared for discharge by consultants (if involved): No    Discharge Planner Nurse   Safe discharge environment identified: Yes  Barriers to discharge: Yes       Entered by: Stephanie Guevara 09/12/2017 9:19 PM     Please review provider order for any additional goals.   Nurse to notify provider when observation goals have been met and patient is ready for discharge.

## 2017-09-13 NOTE — PLAN OF CARE
Problem: Goal Outcome Summary  Goal: Goal Outcome Summary  Outcome: Improving  Pt was admitted for choledocholitiasis and abdominal pain. Had ERCP around 1300 and back on unit at 1705. VSS, 150s for BP and pulse ranging in high 60s-90s. Bowel sounds hypoactive, not passing gas. Pt has not needed any nausea medication or pain medication on this shift. If pt starts to have pain again, please give a nausea medication first and then pain medication to prevent emesis. Continues to have constipation, follow-up with MD about starting pt on scheduled senna.  GI and surgery consulted. Per pt he will look at gallbladder surgery education more in the morning due to being tired. Follow-up with pt with any questions. Continue plan of care.

## 2017-09-13 NOTE — PROGRESS NOTES
[facility]    Hospitalist Progress Note  Name: Edward Szymanski    MRN: [id number]  Provider:  Mandeep Hernandez DO, FHM (Text Page)    Assessment & Plan   Summary of Stay: Edward Szymanski is a 47 year old male who was admitted on 9/11/2017.  He has had on and off RUQ abdominal complaints for quite some time that recently worsened.  He was seen in the ER 9/9 and then returned 9/11 feeling worse.    1.  Probable acute choledocholithiasis with associated pancreatitis, prior history consistent with recurrent GB complaints:  -  S/P ERCP yesterday.  He felt well until this AM and suddenly worse again with abdominal pain.  LFT's still abnormal.  Question ongoing pancreatitis vs new stone/obstruction.  Agree with CT already ordered by GI.  Continue NPO status with IVF, pain meds.   -  Continue unasyn until etiology of worsening pain clear  -  Asked for stricter I/O's    2.  Ileus associated with #1:  -  Hypoactive bowel sounds, reduced flatus/stools.  Monitor for now.  I explained to him this is common with his recent issues and he should not avoid pain meds with his severe pain over concerns of the ileus given how uncomfortable he is.    DVT Prophylaxis: Pneumatic Compression Devices  Code Status: Full Code    Disposition Plan   Expected discharge unclear, likely 2+ days to prior living arrangement once pancreatitis resolves.     Entered: Mandeep Hernandez 09/13/2017, 10:50 AM       Interval History   Mr. Szymanski reports he felt dramatically better last evening.  Unfortunately this AM his pain returned quite severely.  No other new complaints.  He had some flatus and a small bowel movement.  He is afraid of taking the narcotics due to constipation.  He has been urinating frequently by report.    -Data reviewed today: I reviewed all new labs and imaging reports over the last 24 hours. I personally reviewed no images or EKG's today.    Physical Exam   Temp: 98.5  F (36.9  C) Temp src: Oral BP: 136/88 Pulse: 103 Heart Rate:  98 Resp: 16 SpO2: 94 % O2 Device: None (Room air) Oxygen Delivery: 10 LPM  There were no vitals filed for this visit.  Vital Signs with Ranges  Temp:  [95.2  F (35.1  C)-99.3  F (37.4  C)] 98.5  F (36.9  C)  Pulse:  [103] 103  Heart Rate:  [] 98  Resp:  [10-17] 16  BP: (132-163)/() 136/88  FiO2 (%):  [100 %] 100 %  SpO2:  [93 %-100 %] 94 %  I/O last 3 completed shifts:  In: 3620 [P.O.:30; I.V.:3590]  Out: -     GEN:  Alert, oriented x 3, appears ill, more uncomfortable than yesterday.  HEENT:  Normocephalic/atraumatic, some scleral icterus, no nasal discharge, mouth moist.  CV:  Regular rate and rhythm, no murmur or JVD.  S1 + S2 noted, no S3 or S4.  LUNGS:  Clear to auscultation anterior/laterally without rales/rhonchi/wheezing/retractions.  Breath sounds diminished toward bases.  Symmetric chest rise on inhalation noted.  ABD:  Very hypoactive bowel sounds, soft, quite tender central ABD, moderately distended.  No rebound/rigidity.  EXT:  No edema.  No cyanosis.  No acute joint synovitis noted.  SKIN:  Dry to touch, no exanthems noted in the visualized areas.    Medications     - MEDICATION INSTRUCTIONS -       - MEDICATION INSTRUCTIONS -       NaCl 125 mL/hr at 09/12/17 2005       sodium chloride (PF)  3 mL Intracatheter Q8H     ampicillin-sulbactam (UNASYN) IV  3 g Intravenous Q6H     Data       Recent Labs  Lab 09/13/17  0648 09/12/17  0640 09/11/17  1600   WBC 19.5* 11.8* 13.5*   HGB 14.3 15.4 16.8   HCT 42.5 46.3 49.0   MCV 83 84 83    273 268       Recent Labs  Lab 09/13/17  0648 09/12/17  0640 09/11/17  1600    138 137   POTASSIUM 3.9 3.8 3.9   CHLORIDE 106 107 101   CO2 25 25 28   ANIONGAP 8 6 8   GLC 92 102* 135*   BUN 13 10 10   CR 0.72 0.69 0.82   GFRESTIMATED >90 >90 >90   GFRESTBLACK >90 >90 >90   MONICA 8.6 8.2* 9.3   PROTTOTAL 6.7* 6.4* 7.7   ALBUMIN 3.0* 3.1* 3.8   BILITOTAL 2.9* 5.5* 5.7*   ALKPHOS 234* 231* 243*   AST 88* 151* 176*   * 434* 520*       Recent Labs  Lab  09/13/17  0648 09/11/17  1600 09/09/17  0029   LIPASE 3491* 17879* 135         Recent Results (from the past 24 hour(s))   XR ERCP    Narrative    This exam was marked as non-reportable because it will not be read by a   radiologist or a [place] non-radiologist provider.

## 2017-09-14 LAB
ALBUMIN SERPL-MCNC: 2.7 G/DL (ref 3.4–5)
ALP SERPL-CCNC: 174 U/L (ref 40–150)
ALT SERPL W P-5'-P-CCNC: 234 U/L (ref 0–70)
ANION GAP SERPL CALCULATED.3IONS-SCNC: 7 MMOL/L (ref 3–14)
AST SERPL W P-5'-P-CCNC: 48 U/L (ref 0–45)
BILIRUB SERPL-MCNC: 2.5 MG/DL (ref 0.2–1.3)
BUN SERPL-MCNC: 12 MG/DL (ref 7–30)
CALCIUM SERPL-MCNC: 8.3 MG/DL (ref 8.5–10.1)
CHLORIDE SERPL-SCNC: 107 MMOL/L (ref 94–109)
CO2 SERPL-SCNC: 25 MMOL/L (ref 20–32)
CREAT SERPL-MCNC: 0.7 MG/DL (ref 0.66–1.25)
ERYTHROCYTE [DISTWIDTH] IN BLOOD BY AUTOMATED COUNT: 13.7 % (ref 10–15)
GFR SERPL CREATININE-BSD FRML MDRD: >90 ML/MIN/1.7M2
GLUCOSE SERPL-MCNC: 99 MG/DL (ref 70–99)
HCT VFR BLD AUTO: 40 % (ref 40–53)
HGB BLD-MCNC: 13.2 G/DL (ref 13.3–17.7)
LIPASE SERPL-CCNC: 1114 U/L (ref 73–393)
MCH RBC QN AUTO: 27.7 PG (ref 26.5–33)
MCHC RBC AUTO-ENTMCNC: 33 G/DL (ref 31.5–36.5)
MCV RBC AUTO: 84 FL (ref 78–100)
PLATELET # BLD AUTO: 249 10E9/L (ref 150–450)
POTASSIUM SERPL-SCNC: 3.7 MMOL/L (ref 3.4–5.3)
PROT SERPL-MCNC: 6.3 G/DL (ref 6.8–8.8)
RBC # BLD AUTO: 4.77 10E12/L (ref 4.4–5.9)
SODIUM SERPL-SCNC: 139 MMOL/L (ref 133–144)
WBC # BLD AUTO: 18.5 10E9/L (ref 4–11)

## 2017-09-14 PROCEDURE — 36415 COLL VENOUS BLD VENIPUNCTURE: CPT | Performed by: INTERNAL MEDICINE

## 2017-09-14 PROCEDURE — 12000007 ZZH R&B INTERMEDIATE

## 2017-09-14 PROCEDURE — 85027 COMPLETE CBC AUTOMATED: CPT | Performed by: INTERNAL MEDICINE

## 2017-09-14 PROCEDURE — 25800025 ZZH RX 258: Performed by: INTERNAL MEDICINE

## 2017-09-14 PROCEDURE — 99231 SBSQ HOSP IP/OBS SF/LOW 25: CPT | Performed by: SURGERY

## 2017-09-14 PROCEDURE — 25000128 H RX IP 250 OP 636: Performed by: INTERNAL MEDICINE

## 2017-09-14 PROCEDURE — 83690 ASSAY OF LIPASE: CPT | Performed by: INTERNAL MEDICINE

## 2017-09-14 PROCEDURE — 99232 SBSQ HOSP IP/OBS MODERATE 35: CPT | Performed by: INTERNAL MEDICINE

## 2017-09-14 PROCEDURE — 80053 COMPREHEN METABOLIC PANEL: CPT | Performed by: INTERNAL MEDICINE

## 2017-09-14 RX ORDER — DEXTROSE MONOHYDRATE, SODIUM CHLORIDE, AND POTASSIUM CHLORIDE 50; 1.49; 4.5 G/1000ML; G/1000ML; G/1000ML
INJECTION, SOLUTION INTRAVENOUS CONTINUOUS
Status: DISCONTINUED | OUTPATIENT
Start: 2017-09-14 | End: 2017-09-16

## 2017-09-14 RX ADMIN — AMPICILLIN SODIUM AND SULBACTAM SODIUM 3 G: 2; 1 INJECTION, POWDER, FOR SOLUTION INTRAMUSCULAR; INTRAVENOUS at 18:12

## 2017-09-14 RX ADMIN — AMPICILLIN SODIUM AND SULBACTAM SODIUM 3 G: 2; 1 INJECTION, POWDER, FOR SOLUTION INTRAMUSCULAR; INTRAVENOUS at 12:27

## 2017-09-14 RX ADMIN — AMPICILLIN SODIUM AND SULBACTAM SODIUM 3 G: 2; 1 INJECTION, POWDER, FOR SOLUTION INTRAMUSCULAR; INTRAVENOUS at 00:32

## 2017-09-14 RX ADMIN — SODIUM CHLORIDE: 9 INJECTION, SOLUTION INTRAVENOUS at 00:32

## 2017-09-14 RX ADMIN — POTASSIUM CHLORIDE, DEXTROSE MONOHYDRATE AND SODIUM CHLORIDE: 150; 5; 450 INJECTION, SOLUTION INTRAVENOUS at 09:14

## 2017-09-14 RX ADMIN — POTASSIUM CHLORIDE, DEXTROSE MONOHYDRATE AND SODIUM CHLORIDE: 150; 5; 450 INJECTION, SOLUTION INTRAVENOUS at 19:31

## 2017-09-14 RX ADMIN — AMPICILLIN SODIUM AND SULBACTAM SODIUM 3 G: 2; 1 INJECTION, POWDER, FOR SOLUTION INTRAMUSCULAR; INTRAVENOUS at 06:19

## 2017-09-14 NOTE — PLAN OF CARE
Problem: Goal Outcome Summary  Goal: Goal Outcome Summary  Outcome: Improving  Patient was admitted for choledocholithiasis. Pain treated with IV dilaudid twice this shift, increased at the start of shift but got better. Gave both zofran and compazine. Patient alert and oriented x4.  VSS.  Up independently in room.  NPO w/ ice chips.  Continues on Unasyn. Lost IV site due to right arm having increase edema, new IV in right arm. Passing gas and had a bowel movement today. CT of abdomen done in AM.  Voiding well.  GI and surgery following. Continue to monitor.

## 2017-09-14 NOTE — PLAN OF CARE
Problem: Goal Outcome Summary  Goal: Goal Outcome Summary  Outcome: Improving  Pain rates 2-3 today in abd declined need for pain medications, up walking in [place] independently, started on clear liquids but didn't do much as he feels distended and bloated, did have x3 stools on nights and is passing flatus but BS faint. Continues on Iv abx, nausea mild and declined any pain medications, max temp 99.6

## 2017-09-14 NOTE — PROGRESS NOTES
"[facility]  Hospitalist Progress Note  Delonte Esparza MD, MD 09/14/2017  (Text Page)  Reason for Stay (Diagnosis): acute biliary pancreatitis with choledocholithiasis         Assessment and Plan:      Summary of Stay: Edward Szymanski is a 47 year old male admitted on 9/11/2017 with abdominal pain    Problem List:   1. Acute biliary pancreatitis with choledocholithiasis  2. Hx of hypertension not on maintenance medications  3. Ileus- resolved    Continue inpatient care. On clear liquids. Slow advance please.  IVF support. Change with KCL and D5 0.45  Needs Incentive spirometer.  Pain control.  Appreciate GI and surgery input. On IV unasyn  Needs to ambulate as much as you can tolerate.  Start oral anti-hypertensive if with persistent increase BP levels    DVT Prophylaxis: Pneumatic Compression Devices  Code Status: Full Code  Discharge Dispo: home  Estimated Disch Date / # of Days until Disch: > 2 days        Interval History (Subjective):      Assumed care today.  Seen and examined.  Wife at bedside.  Earlier had some low grade fever.  Had flatus and BM yesterday and this morning.  He felt nauseous trying some clear liquids this morning.  Pain is under control.                  Physical Exam:      Last Vital Signs:  BP (!) 155/94 (BP Location: Left arm)  Pulse 103  Temp 99.5  F (37.5  C) (Oral)  Resp 14  Ht 1.803 m (5' 10.98\")  SpO2 96%    I/O last 3 completed shifts:  In: 3492 [P.O.:600; I.V.:2892]  Out: 875 [Urine:875]  Wt Readings from Last 1 Encounters:   09/09/17 93 kg (205 lb)     There were no vitals filed for this visit.    Constitutional: Awake, alert, cooperative, no apparent distress   Respiratory: Clear to auscultation bilaterally, no crackles or wheezing   Cardiovascular: Regular rate and rhythm, normal S1 and S2, and no murmur noted   Abdomen: Normal bowel sounds, soft, mild distended mild tender at epigastric area. No guarding   Skin: No rashes, no cyanosis, dry to touch   Neuro: " Alert and oriented x3, no weakness, spontaneous and coherent speech   Extremities: No edema, normal range of motion   Other(s): Euthymic mood, not agitated       All other systems: Negative          Medications:      All current medications were reviewed with changes reflected in problem list.         Data:      All new lab and imaging data was reviewed.   Labs:  No results for input(s): CULT in the last 168 hours.    Recent Labs  Lab 09/14/17  0711 09/13/17  0648 09/12/17  0640   WBC 18.5* 19.5* 11.8*   HGB 13.2* 14.3 15.4   HCT 40.0 42.5 46.3   MCV 84 83 84    279 273       Recent Labs  Lab 09/14/17  0711 09/13/17  1435 09/13/17  0648    138 139   POTASSIUM 3.7 4.3 3.9   CHLORIDE 107 105 106   CO2 25 26 25   ANIONGAP 7 7 8   GLC 99 90 92   BUN 12 13 13   CR 0.70 0.78 0.72   GFRESTIMATED >90 >90 >90   GFRESTBLACK >90 >90 >90   MONICA 8.3* 8.4* 8.6   PROTTOTAL 6.3* 6.2* 6.7*   ALBUMIN 2.7* 2.7* 3.0*   BILITOTAL 2.5* 2.5* 2.9*   ALKPHOS 174* 201* 234*   AST 48* 65* 88*   * 296* 359*       Recent Labs  Lab 09/14/17  0711 09/13/17  1435 09/13/17  0648 09/12/17  0640 09/11/17  1600   GLC 99 90 92 102* 135*       Recent Labs  Lab 09/14/17  0711 09/13/17  0648 09/11/17  1600   LIPASE 1114* 3491* 86290*     No results for input(s): COLOR, APPEARANCE, URINEGLC, URINEBILI, URINEKETONE, SG, UBLD, URINEPH, PROTEIN, UROBILINOGEN, NITRITE, LEUKEST, RBCU, WBCU in the last 168 hours.   Imaging:   Results for orders placed or performed during the hospital encounter of 09/11/17   Abdomen US, limited (RUQ only)    Narrative    RIGHT UPPER QUADRANT ULTRASOUND  9/11/2017  4:35 PM    HISTORY: Right upper quadrant/epigastric pain.    COMPARISON: None.    FINDINGS:   Gallbladder: Multiple gallstones and sludge. Negative sonographic  Adam's sign, although patient has taken pain medication. There is  curvilinear echogenicity at the gallbladder fundus which could be  calcification of the wall of the gallbladder fundus or a  stone against  the fundal portion of the gallbladder.    Bile ducts: 0.8 cm common hepatic duct is dilated.     Liver: No focal liver lesions identified.    Pancreas: Pancreatic head and body grossly normal but not optimally  visualized. Pancreatic tail obscured.    Right kidney: Normal 11.8 cm.      Impression    IMPRESSION: Cholelithiasis and gallbladder sludge, dilated 0.8 cm  common hepatic duct. Gallbladder mildly distended 12 cm in length.  Borderline gallbladder wall thickening 0.4 cm. Cannot rule out early  cholecystitis, although patient took pain medication and sonographic  Adam's sign is negative.    YESI DALE MD   XR ERCP    Narrative    This exam was marked as non-reportable because it will not be read by a   radiologist or a [place] non-radiologist provider.             CT Abdomen Pelvis w Contrast    Narrative    CT ABDOMEN/PELVIS WITH CONTRAST September 13, 2017 1:13 PM     HISTORY: Abdominal pain status post ERCP yesterday. Abnormal liver  function tests. Cholelithiasis. Presumed pancreatitis.    TECHNIQUE: 100mL Isovue-370. Radiation dose for this scan was reduced  using automated exposure control, adjustment of the mA and/or kV  according to patient size, or iterative reconstruction technique.    COMPARISON: 6/2/2015.    FINDINGS:   Abdomen: Infiltrates in the lung bases consistent with atelectasis.  Small left pleural effusion. Strand-like densities in the fat around  the pancreas especially the body and tail are consistent with  pancreatitis. These extend into the small bowel mesentery. Similar  strand-like densities are noted bilaterally in the retroperitoneum  marginating the psoas muscles and the quadratus lumborum muscles. No  evidence of pancreatic pseudocyst. Mild nonspecific dilatation of the  duodenum probably indicates ileus.    Gallbladder is normal in size with mural edema and cholelithiasis. The  liver, spleen, and adrenal glands appear normal. Tiny cyst laterally  in the  right kidney. Left kidney is enlarged with either peripelvic  cysts or ureteropelvic junction obstruction and dilated collecting  system. The stone is seen in the lower pole collecting system on the  left, unchanged. The stomach, small bowel and colon otherwise appear  normal.    Pelvis: Bladder, prostate and seminal vesicles appear normal. Small  bilateral inguinal hernias containing fat. Small amount of free fluid  in the peritoneum in the cul-de-sac. No bone lesions.      Impression    IMPRESSION:  1. Mild acute pancreatitis centered in the pancreatic tail. This  counts for ileus and bibasilar atelectasis and left pleural effusion  as well.  2. Cholelithiasis and mural edema in the gallbladder wall but no  gallbladder distention.  3. Left renal peripelvic cysts versus ureteropelvic junction  obstruction, with a small nonobstructive stone in a posterior left  lower pole renal calyx. This is unchanged.    JASON SOTO MD

## 2017-09-14 NOTE — PROGRESS NOTES
"[facility]  General Surgery Progress Note           Assessment and Plan:   Assessment:   Choledocholithiasis - resolved with ERCP  Pancreatitis  Cholelithiasis  Tmax 100.6      Plan:   -Outpatient lap edgar planned for the end of the month, per patient.   -GI following, on unasyn          Interval History:   Comfortable in bed, began clear liquids today and has noticed an increase in discomfort, recommended discontinuing if this persists or worsens. Otherwise pain is improved, up walking, voiding independently, +flatus, +BM.          Physical Exam:   Blood pressure (!) 155/94, pulse 103, temperature 99.5  F (37.5  C), temperature source Oral, resp. rate 14, height 1.803 m (5' 10.98\"), SpO2 96 %.    I/O last 3 completed shifts:  In: 3492 [P.O.:600; I.V.:2892]  Out: 875 [Urine:875]    Abdomen:   firm, distended, tenderness noted in the epigastric region, voluntary guarding absent and no masses palpated               Data:     Recent Labs   Lab Test  09/13/17   0648  09/12/17   0640  09/11/17   1600   HGB  14.3  15.4  16.8   WBC  19.5*  11.8*  13.5*         Recent Labs  Lab 09/14/17  0711 09/13/17  1435 09/13/17  0648   AST 48* 65* 88*   * 296* 359*   ALKPHOS 174* 201* 234*   BILITOTAL 2.5* 2.5* 2.9*       Recent Labs  Lab 09/14/17  0711 09/13/17  0648 09/11/17  1600   LIPASE 1114* 3491* 29167*       Jillian Vazquez PA-C     Seen and agree,    Dean Moore MD  [facility]    "

## 2017-09-14 NOTE — PLAN OF CARE
Problem: Goal Outcome Summary  Goal: Goal Outcome Summary  Outcome: No Change  Ambulatory Status:  Pt up independently.  VS:  vss  Pain:  Minimal pain this shift  Resp: LS clear  GI:  Zofran for nausea x1.  NPO.  BS hypoactive.  Passing flatus.  Last BM 9/13.  Tx:  unasyn  Labs:  Lipase 3491  Consults:  GI, surgery  Disposition:  tbd

## 2017-09-14 NOTE — PROGRESS NOTES
"GASTROENTEROLOGY PROGRESS NOTE       SUBJECTIVE:  Feeling \"much better\" today. Had bowel movement yesterday and passing gas. Still some mild epigastric to LUQ tenderness but improving. Denies hunger but is thirsty.     OBJECTIVE:    BP (!) 149/92  Pulse 103  Temp 98.9  F (37.2  C) (Oral)  Resp 14  Ht 1.803 m (5' 10.98\")  SpO2 94%  Temp (24hrs), Av.7  F (37.6  C), Min:98.5  F (36.9  C), Max:100.6  F (38.1  C)    No data found.      Intake/Output Summary (Last 24 hours) at 17 0754  Last data filed at 17 0600   Gross per 24 hour   Intake             3492 ml   Output              875 ml   Net             2617 ml        PHYSICAL EXAM    Constitutional: no acute distress  Cardiovascular: RRR, normal S1, S2, no murmur appreciated   Respiratory: good transmission, CTAB  Abdomen: soft, mild TTP in epigastric LUQ, +bowel sounds  NEURO: CN 2-12 grossly intact, no focal deficits  SKIN: No jaundice         Additional Comments:  ROS, FH, SH: See initial GI consult for details.     I have reviewed the patient's new clinical lab results:     Recent Labs   Lab Test  17   0711  17   0648  17   0640   WBC  18.5*  19.5*  11.8*   HGB  13.2*  14.3  15.4   MCV  84  83  84   PLT  249  279  273     Recent Labs   Lab Test  17   1435  17   0648  17   0640   POTASSIUM  4.3  3.9  3.8   CHLORIDE  105  106  107   CO2  26  25  25   BUN  13  13  10   ANIONGAP  7  8  6     Recent Labs   Lab Test  17   1435  17   0648  17   0640  17   1600  17   0029  06/01/15   1057  05/19/15   0839   ALBUMIN  2.7*  3.0*  3.1*  3.8  3.9  4.2  4.1   BILITOTAL  2.5*  2.9*  5.5*  5.7*  0.6  0.8  0.8   ALT  296*  359*  434*  520*  60  39  47   AST  65*  88*  151*  176*  62*  18  13   LIPASE   --   3491*   --   65483*  135  154  147   AMYLASE   --    --    --    --    --   66  57   CT ABDOMEN/PELVIS WITH CONTRAST 2017 1:13 PM      HISTORY: Abdominal pain status post ERCP " yesterday. Abnormal liver  function tests. Cholelithiasis. Presumed pancreatitis.     TECHNIQUE: 100mL Isovue-370. Radiation dose for this scan was reduced  using automated exposure control, adjustment of the mA and/or kV  according to patient size, or iterative reconstruction technique.     COMPARISON: 6/2/2015.     FINDINGS:   Abdomen: Infiltrates in the lung bases consistent with atelectasis.  Small left pleural effusion. Strand-like densities in the fat around  the pancreas especially the body and tail are consistent with  pancreatitis. These extend into the small bowel mesentery. Similar  strand-like densities are noted bilaterally in the retroperitoneum  marginating the psoas muscles and the quadratus lumborum muscles. No  evidence of pancreatic pseudocyst. Mild nonspecific dilatation of the  duodenum probably indicates ileus.     Gallbladder is normal in size with mural edema and cholelithiasis. The  liver, spleen, and adrenal glands appear normal. Tiny cyst laterally  in the right kidney. Left kidney is enlarged with either peripelvic  cysts or ureteropelvic junction obstruction and dilated collecting  system. The stone is seen in the lower pole collecting system on the  left, unchanged. The stomach, small bowel and colon otherwise appear  normal.     Pelvis: Bladder, prostate and seminal vesicles appear normal. Small  bilateral inguinal hernias containing fat. Small amount of free fluid  in the peritoneum in the cul-de-sac. No bone lesions.         IMPRESSION:  1. Mild acute pancreatitis centered in the pancreatic tail. This  counts for ileus and bibasilar atelectasis and left pleural effusion  as well.  2. Cholelithiasis and mural edema in the gallbladder wall but no  gallbladder distention.  3. Left renal peripelvic cysts versus ureteropelvic junction  obstruction, with a small nonobstructive stone in a posterior left  lower pole renal calyx. This is unchanged.     ASSESSMENT/ PLAN  46 yo male admitted  with gall stone pancreatitis. ERCP successful 9/12 with stone removal and sphincterotomy. Await AM labs, but LFTs were trending down yesterday. CT completed yesterday due to worsening pain and elevated WBC- findings of cholelithiasis with mural edema and pancreatitis but no pancreatitis complications such as pseudocyst or abscess. On Abx.  -Bowel movement yesterday, continue to monitor for constipation.  -Okay to trial clear liquid diet. If pain worsens then back to NPO, versus if tolerates, consider low fat diet later today/tomorrow (such as plain crackers, toast, rice, broth soups)      Tamara Dela Cruz PA-C  [facility]

## 2017-09-15 PROCEDURE — 12000007 ZZH R&B INTERMEDIATE

## 2017-09-15 PROCEDURE — 99232 SBSQ HOSP IP/OBS MODERATE 35: CPT | Performed by: INTERNAL MEDICINE

## 2017-09-15 PROCEDURE — 25800025 ZZH RX 258: Performed by: INTERNAL MEDICINE

## 2017-09-15 PROCEDURE — 25000128 H RX IP 250 OP 636: Performed by: INTERNAL MEDICINE

## 2017-09-15 PROCEDURE — 99232 SBSQ HOSP IP/OBS MODERATE 35: CPT | Performed by: SURGERY

## 2017-09-15 RX ADMIN — AMPICILLIN SODIUM AND SULBACTAM SODIUM 3 G: 2; 1 INJECTION, POWDER, FOR SOLUTION INTRAMUSCULAR; INTRAVENOUS at 11:55

## 2017-09-15 RX ADMIN — AMPICILLIN SODIUM AND SULBACTAM SODIUM 3 G: 2; 1 INJECTION, POWDER, FOR SOLUTION INTRAMUSCULAR; INTRAVENOUS at 18:36

## 2017-09-15 RX ADMIN — POTASSIUM CHLORIDE, DEXTROSE MONOHYDRATE AND SODIUM CHLORIDE: 150; 5; 450 INJECTION, SOLUTION INTRAVENOUS at 03:58

## 2017-09-15 RX ADMIN — AMPICILLIN SODIUM AND SULBACTAM SODIUM 3 G: 2; 1 INJECTION, POWDER, FOR SOLUTION INTRAMUSCULAR; INTRAVENOUS at 00:05

## 2017-09-15 RX ADMIN — AMPICILLIN SODIUM AND SULBACTAM SODIUM 3 G: 2; 1 INJECTION, POWDER, FOR SOLUTION INTRAMUSCULAR; INTRAVENOUS at 05:31

## 2017-09-15 NOTE — PLAN OF CARE
Problem: Goal Outcome Summary  Goal: Goal Outcome Summary  Outcome: No Change  VSS. A/ox4. Reports queasy feeling after trying sips of clears from the day. BS active. Passing minimal flatus at this time. Up independent. IVF running via PIV. Denies pain medication this shift.

## 2017-09-15 NOTE — PLAN OF CARE
Problem: Goal Outcome Summary  Goal: Goal Outcome Summary  Outcome: Improving  Vital signs stable. Afebrile.  Pain in abdomen greatly improved- pt only rating 1/10.  Tolerating only water today. Has advanced as tolerated diet but not advanced yet. Passing flatus, had one BM today, denies any nausea. Unasyn IV abx.  Up independently. Ambulated in halls several times.  Anticipating D/C tomorrow if stable and cleared by GI.

## 2017-09-15 NOTE — PROGRESS NOTES
"[facility]  General Surgery Progress Note         Assessment and Plan:   Assessment:   Choledocholithiasis - resolved with ERCP  Pancreatitis  Cholelithiasis  Afebrile      Plan:   -Outpatient lap edgar planned for the end of the month  -GI following, on unasyn   -Diet: per GI, advance as tolerated  -Increase activity as tolerated  -Will follow peripherally at this time         Interval History:   States he is feeling much better today.  Had some abdominal discomfort with clears yesterday. Restarted clears this morning and is doing well. Denies pain. Does not need any pain medications. He is up walking, voiding independently, passing flatus, last BM was a few days ago. He tells me he has a lap edgar scheduled with Dr. Zhou at the end of the month.         Physical Exam:   Blood pressure 144/86, pulse 103, temperature 98.2  F (36.8  C), temperature source Oral, resp. rate 16, height 1.803 m (5' 10.98\"), SpO2 96 %.    I/O last 3 completed shifts:  In: 2110 [P.O.:440; I.V.:1670]  Out: 1650 [Urine:1650]    Abdomen:   Soft, non-tender, mildly distended, +BS               Data:       Recent Labs  Lab 09/14/17  0711 09/13/17  0648 09/12/17  0640   WBC 18.5* 19.5* 11.8*   HGB 13.2* 14.3 15.4   HCT 40.0 42.5 46.3   MCV 84 83 84    279 273       Recent Labs  Lab 09/14/17  0711 09/13/17  1435 09/13/17  0648   AST 48* 65* 88*   * 296* 359*   ALKPHOS 174* 201* 234*   BILITOTAL 2.5* 2.5* 2.9*       Recent Labs  Lab 09/14/17  0711 09/13/17  0648 09/11/17  1600   LIPASE 1114* 3491* 31570*       Henok Crowe PA-C     As above, discussed pathophysiology of pancreatitis, retroperitoneal edema and ileus.   Advancing diet as tolerated, hopes for DC tomorrow.   We will follow peripherally, please call if we can be of help  Demond Zhou MD  9/15/2017 3:55 PM    "

## 2017-09-15 NOTE — PROGRESS NOTES
"GASTROENTEROLOGY PROGRESS NOTE        SUBJECTIVE:  Little to no abdominal pain. Tolerating slow consumption of clears. Passing flatus. Afebrile.      OBJECTIVE:    /86 (BP Location: Right arm)  Pulse 103  Temp 98.2  F (36.8  C) (Oral)  Resp 16  Ht 1.803 m (5' 10.98\")  SpO2 96%  Temp (24hrs), Av.1  F (37.3  C), Min:98.2  F (36.8  C), Max:99.6  F (37.6  C)    No data found.      Intake/Output Summary (Last 24 hours) at 09/15/17 0952  Last data filed at 09/15/17 0700   Gross per 24 hour   Intake             2900 ml   Output             1375 ml   Net             1525 ml        PHYSICAL EXAM     Constitutional: NAD, up in bedside chair.  Cardiovascular: RRR, normal S1, S2, no murmur appreciated   Respiratory: good transmission, CTAB  Abdomen: soft, NTTP  NEURO: CN 2-12 grossly intact, no focal deficits           Additional Comments:  ROS, FH, SH: See initial GI consult for details.     I have reviewed the patient's new clinical lab results:     Recent Labs   Lab Test  17   0711  17   0648  17   0640   WBC  18.5*  19.5*  11.8*   HGB  13.2*  14.3  15.4   MCV  84  83  84   PLT  249  279  273     Recent Labs   Lab Test  17   0711  17   1435  17   0648   POTASSIUM  3.7  4.3  3.9   CHLORIDE  107  105  106   CO2  25  26  25   BUN  12  13  13   ANIONGAP  7  7  8     Recent Labs   Lab Test  17   0711  17   1435  17   0648   17   1600   06/01/15   1057  05/19/15   0839   ALBUMIN  2.7*  2.7*  3.0*   < >  3.8   < >  4.2  4.1   BILITOTAL  2.5*  2.5*  2.9*   < >  5.7*   < >  0.8  0.8   ALT  234*  296*  359*   < >  520*   < >  39  47   AST  48*  65*  88*   < >  176*   < >  18  13   LIPASE  1114*   --   3491*   --   61690*   < >  154  147   AMYLASE   --    --    --    --    --    --   66  57    < > = values in this interval not displayed.        ASSESSMENT/ PLAN  Edward Szymanski is a 46 yo male without significant past medical history who presented to the hospital " with abdominal pain found to have gallstone pancreatitis. He is s/p ERCP on 9/12 with stone removal and sphincterotomy. LFTs and lipase are slowly improving.     1. Gallstone pancreatitis: He is now tolerating small volumes. Abdominal pain improving. Recent CT a/p (9/13) revealed cholelithiasis and mural edema, mild pancreatitis without pseudocyst. Plan for cholecystectomy later this month.   -- Continue clear liquids. If tolerates can consider low fat later today.   - Lab ordered for tomorrow      Apryl Sanchez PA-C  [facility]

## 2017-09-15 NOTE — PLAN OF CARE
Problem: Goal Outcome Summary  Goal: Goal Outcome Summary  Outcome: No Change  A& O  Ambulatory Status:  Pt up Independently.  VS:  Temp max 99.3, hypertension  Pain:  Minimal pain  Resp: LS clear  GI:  mild nausea declined antiemetic.  Clear liquid diet ordered, not taking any liquids in due to nausea.  BS hypoactive.  Passing flatus.  Last BM 9/14.  Tx:  unasyn  Labs:  Lipase 1,114  Consults:  GI, surgery  Disposition:  tbd

## 2017-09-15 NOTE — PROGRESS NOTES
"[facility]  Hospitalist Progress Note  Ml Parsons MD 09/15/2017    Reason for Stay (Diagnosis): gallstone pancreatitis         Assessment and Plan:      Summary of Stay: Edward Szymanski is a 47 year old male without significant past medical history   admitted on 9/11/2017 with abdominal pain 2/2 gallstone pancreatitis complicated by ileus  Surgery and GI were involved in his care and his symptoms have dramatically improved after ERCP.  Plans at this time are for elective cholecystectomy on 9/29/17     He was initiated on clears on the belle of 9/14 but didn't tolerate due to bloating, so held off but has been tolerating them today, will adat to low fat with a discussion regarding choosing wisely  Problem List:   1. Gallstone pancreatitis:  S/p ERCP with dramatic improvement in symptoms. adat to low fat, plan for cholecystectomy on 9/29. Appreciate GI and surgery input.   2. Ileus:  2/2 to above, resolved  DVT Prophylaxis: Pneumatic Compression Devices  Code Status: Full Code  Discharge Dispo: home  Estimated Disch Date / # of Days until Disch: tomorrow if continues to improve        Interval History (Subjective):      Doing well, actually did get bloated with liquids last night so held off, but seems to be tolerating them now. Has done well all day so will adat to low fat this belle.  No cp or sob ambulating without difficulty, no n/v                  Physical Exam:      Last Vital Signs:  /85 (BP Location: Right arm)  Pulse 103  Temp 98.2  F (36.8  C) (Oral)  Resp 16  Ht 1.803 m (5' 10.98\")  SpO2 97%    Pleasant nad looks staed age head nc/at sclera clear lungs ctab nl effort RRR no m/r/g no le edema abd slight protuberance but soft with active bs no ttp alert and oriented affect appropriate ambulating without difficulty            Medications:      All current medications were reviewed with changes reflected in problem list.         Data:      All new lab and imaging data was reviewed. "   Labs:    Recent Labs  Lab 09/14/17  0711      POTASSIUM 3.7   CHLORIDE 107   CO2 25   ANIONGAP 7   GLC 99   BUN 12   CR 0.70   GFRESTIMATED >90   GFRESTBLACK >90   MONICA 8.3*       Recent Labs  Lab 09/14/17  0711   WBC 18.5*   HGB 13.2*   HCT 40.0   MCV 84          Recent Labs  Lab 09/14/17  0711 09/13/17  1435 09/13/17  0648   AST 48* 65* 88*   * 296* 359*   ALKPHOS 174* 201* 234*   BILITOTAL 2.5* 2.5* 2.9*    Lipase 40 K on admission ->->1114 on 9/14  Imaging: CT Abdomen and Pelvis w/contrast on admission   IMPRESSION:  1. Mild acute pancreatitis centered in the pancreatic tail. This  counts for ileus and bibasilar atelectasis and left pleural effusion  as well.  2. Cholelithiasis and mural edema in the gallbladder wall but no  gallbladder distention.  3. Left renal peripelvic cysts versus ureteropelvic junction  obstruction, with a small nonobstructive stone in a posterior left  lower pole renal calyx. This is unchanged.

## 2017-09-16 VITALS
HEIGHT: 71 IN | RESPIRATION RATE: 16 BRPM | SYSTOLIC BLOOD PRESSURE: 137 MMHG | HEART RATE: 103 BPM | DIASTOLIC BLOOD PRESSURE: 94 MMHG | OXYGEN SATURATION: 97 % | TEMPERATURE: 98.6 F

## 2017-09-16 LAB
ALBUMIN SERPL-MCNC: 2.4 G/DL (ref 3.4–5)
ALP SERPL-CCNC: 143 U/L (ref 40–150)
ALT SERPL W P-5'-P-CCNC: 123 U/L (ref 0–70)
ANION GAP SERPL CALCULATED.3IONS-SCNC: 9 MMOL/L (ref 3–14)
AST SERPL W P-5'-P-CCNC: 27 U/L (ref 0–45)
BASOPHILS # BLD AUTO: 0.1 10E9/L (ref 0–0.2)
BASOPHILS NFR BLD AUTO: 0.4 %
BILIRUB SERPL-MCNC: 1.8 MG/DL (ref 0.2–1.3)
BUN SERPL-MCNC: 8 MG/DL (ref 7–30)
CALCIUM SERPL-MCNC: 8.5 MG/DL (ref 8.5–10.1)
CHLORIDE SERPL-SCNC: 104 MMOL/L (ref 94–109)
CO2 SERPL-SCNC: 24 MMOL/L (ref 20–32)
CREAT SERPL-MCNC: 0.71 MG/DL (ref 0.66–1.25)
DIFFERENTIAL METHOD BLD: ABNORMAL
EOSINOPHIL # BLD AUTO: 0.3 10E9/L (ref 0–0.7)
EOSINOPHIL NFR BLD AUTO: 2.1 %
ERYTHROCYTE [DISTWIDTH] IN BLOOD BY AUTOMATED COUNT: 13.7 % (ref 10–15)
GFR SERPL CREATININE-BSD FRML MDRD: >90 ML/MIN/1.7M2
GLUCOSE SERPL-MCNC: 85 MG/DL (ref 70–99)
HCT VFR BLD AUTO: 36.5 % (ref 40–53)
HGB BLD-MCNC: 12.1 G/DL (ref 13.3–17.7)
IMM GRANULOCYTES # BLD: 0.1 10E9/L (ref 0–0.4)
IMM GRANULOCYTES NFR BLD: 0.5 %
LIPASE SERPL-CCNC: 421 U/L (ref 73–393)
LYMPHOCYTES # BLD AUTO: 1.3 10E9/L (ref 0.8–5.3)
LYMPHOCYTES NFR BLD AUTO: 8.7 %
MCH RBC QN AUTO: 28.1 PG (ref 26.5–33)
MCHC RBC AUTO-ENTMCNC: 33.2 G/DL (ref 31.5–36.5)
MCV RBC AUTO: 85 FL (ref 78–100)
MONOCYTES # BLD AUTO: 1.6 10E9/L (ref 0–1.3)
MONOCYTES NFR BLD AUTO: 10.4 %
NEUTROPHILS # BLD AUTO: 11.7 10E9/L (ref 1.6–8.3)
NEUTROPHILS NFR BLD AUTO: 77.9 %
NRBC # BLD AUTO: 0 10*3/UL
NRBC BLD AUTO-RTO: 0 /100
PLATELET # BLD AUTO: 305 10E9/L (ref 150–450)
POTASSIUM SERPL-SCNC: 3.2 MMOL/L (ref 3.4–5.3)
PROT SERPL-MCNC: 6.3 G/DL (ref 6.8–8.8)
RBC # BLD AUTO: 4.31 10E12/L (ref 4.4–5.9)
SODIUM SERPL-SCNC: 137 MMOL/L (ref 133–144)
WBC # BLD AUTO: 15 10E9/L (ref 4–11)

## 2017-09-16 PROCEDURE — 25000132 ZZH RX MED GY IP 250 OP 250 PS 637: Performed by: INTERNAL MEDICINE

## 2017-09-16 PROCEDURE — 85025 COMPLETE CBC W/AUTO DIFF WBC: CPT | Performed by: PHYSICIAN ASSISTANT

## 2017-09-16 PROCEDURE — 25000128 H RX IP 250 OP 636: Performed by: INTERNAL MEDICINE

## 2017-09-16 PROCEDURE — 80053 COMPREHEN METABOLIC PANEL: CPT | Performed by: PHYSICIAN ASSISTANT

## 2017-09-16 PROCEDURE — 25800025 ZZH RX 258: Performed by: INTERNAL MEDICINE

## 2017-09-16 PROCEDURE — 83690 ASSAY OF LIPASE: CPT | Performed by: PHYSICIAN ASSISTANT

## 2017-09-16 PROCEDURE — 99238 HOSP IP/OBS DSCHRG MGMT 30/<: CPT | Performed by: INTERNAL MEDICINE

## 2017-09-16 PROCEDURE — 36415 COLL VENOUS BLD VENIPUNCTURE: CPT | Performed by: PHYSICIAN ASSISTANT

## 2017-09-16 RX ORDER — POTASSIUM CHLORIDE 1.5 G/1.58G
20-40 POWDER, FOR SOLUTION ORAL
Status: DISCONTINUED | OUTPATIENT
Start: 2017-09-16 | End: 2017-09-16 | Stop reason: HOSPADM

## 2017-09-16 RX ORDER — POTASSIUM CL/LIDO/0.9 % NACL 10MEQ/0.1L
10 INTRAVENOUS SOLUTION, PIGGYBACK (ML) INTRAVENOUS
Status: DISCONTINUED | OUTPATIENT
Start: 2017-09-16 | End: 2017-09-16 | Stop reason: HOSPADM

## 2017-09-16 RX ORDER — POTASSIUM CHLORIDE 29.8 MG/ML
20 INJECTION INTRAVENOUS
Status: DISCONTINUED | OUTPATIENT
Start: 2017-09-16 | End: 2017-09-16 | Stop reason: HOSPADM

## 2017-09-16 RX ORDER — POTASSIUM CHLORIDE 1500 MG/1
20-40 TABLET, EXTENDED RELEASE ORAL
Status: DISCONTINUED | OUTPATIENT
Start: 2017-09-16 | End: 2017-09-16 | Stop reason: HOSPADM

## 2017-09-16 RX ORDER — POTASSIUM CHLORIDE 7.45 MG/ML
10 INJECTION INTRAVENOUS
Status: DISCONTINUED | OUTPATIENT
Start: 2017-09-16 | End: 2017-09-16 | Stop reason: HOSPADM

## 2017-09-16 RX ADMIN — AMPICILLIN SODIUM AND SULBACTAM SODIUM 3 G: 2; 1 INJECTION, POWDER, FOR SOLUTION INTRAMUSCULAR; INTRAVENOUS at 05:27

## 2017-09-16 RX ADMIN — POTASSIUM CHLORIDE, DEXTROSE MONOHYDRATE AND SODIUM CHLORIDE: 150; 5; 450 INJECTION, SOLUTION INTRAVENOUS at 06:46

## 2017-09-16 RX ADMIN — AMPICILLIN SODIUM AND SULBACTAM SODIUM 3 G: 2; 1 INJECTION, POWDER, FOR SOLUTION INTRAMUSCULAR; INTRAVENOUS at 12:00

## 2017-09-16 RX ADMIN — POTASSIUM CHLORIDE 40 MEQ: 1500 TABLET, EXTENDED RELEASE ORAL at 08:08

## 2017-09-16 RX ADMIN — AMPICILLIN SODIUM AND SULBACTAM SODIUM 3 G: 2; 1 INJECTION, POWDER, FOR SOLUTION INTRAMUSCULAR; INTRAVENOUS at 00:44

## 2017-09-16 RX ADMIN — POTASSIUM CHLORIDE 20 MEQ: 1500 TABLET, EXTENDED RELEASE ORAL at 10:03

## 2017-09-16 NOTE — DISCHARGE SUMMARY
Discharge Summary  Hospitalist Service    Edward Szymanski MRN# [id number]   YOB: 1970 Age: 47 year old     Date of Admission:  9/11/2017  Date of Discharge:  9/16/2017  Admitting Physician:  Jovani Conner MD  Discharge Physician: Ml Parsons MD  Discharging Service: Hospitalist Service     Primary Provider: Darren Henderson  Primary Care Physician Phone Number: [phone]         Discharge Diagnoses/Problem Oriented Hospital Course (Providers):    Edward Szymanski was admitted on 9/11/2017 by Jovani Conner MD and I would refer you to their history and physical.  The following problems were addressed during his hospitalization:    Gallstone pancreatitis  ileus         Code Status:      Full Code        Brief Hospital Stay Summary Sent Home With Patient in AVS:       Summary of Stay: Edward Szymanski is a 47 year old male without significant past medical history   admitted on 9/11/2017 with abdominal pain 2/2 gallstone pancreatitis complicated by ileus  Surgery and GI were involved in his care and his symptoms have dramatically improved after ERCP.  Plans at this time are for elective cholecystectomy on 9/29/17      He was initiated on clears on the belle of 9/14 but didn't tolerate due to bloating, so held off but then was tolerating it without difficulty on 9/15/17 so then adat to low fat with a discussion regarding choosing wisely.  He has done well, he continues to eat small amounts of low fat foods with good fluid input.  He feels well and wishes to discharge today, GI agrees.  Also in discussion with GI and surgery, no need for further antibiotics at discharge  Problem List:   1. Gallstone pancreatitis:  S/p ERCP with dramatic improvement in symptoms. adat to low fat, plan for cholecystectomy on 9/29. Appreciate GI and surgery input. He is tolerating low fat diet without difficulty, no indication for antibiotics at discharge in consultation with Dr. Clayton and Dr. Flores  2. Ileus:  2/2 to above,  "resolved  DVT Prophylaxis: Pneumatic Compression Devices  Code Status: Full Code  Discharge Dispo: home         Important Results:      As noted below         Pending Results:        Unresulted Labs Ordered in the Past 30 Days of this Admission     No orders found from 7/13/2017 to 9/12/2017.            Discharge Instructions and Follow-Up:      Follow-up Appointments     Follow-up and recommended labs and tests        F/U with Dr. Olguin as scheduled for cholecystectomy                      Discharge Disposition:      Discharged to home         Discharge Medications:        Current Discharge Medication List      CONTINUE these medications which have NOT CHANGED    Details   cetirizine (ZYRTEC) 10 MG tablet Take 10 mg by mouth daily as needed for allergies      budesonide (RHINOCORT ALLERGY) 32 MCG/ACT spray Spray 1 spray into both nostrils daily as needed for rhinitis or allergies      ibuprofen (ADVIL/MOTRIN) 200 MG tablet Take 400 mg by mouth 3 times daily as needed for mild pain         STOP taking these medications       HYDROcodone-acetaminophen (NORCO) 5-325 MG per tablet Comments:   Reason for Stopping:                 Allergies:       No Known Allergies        Consultations This Hospital Stay:      Consultation during this admission received from gastroenterology and surgery         Condition and Physical on Discharge:      Discharge condition: Stable   Vitals: Blood pressure (!) 137/94, pulse 103, temperature 98.6  F (37  C), temperature source Oral, resp. rate 16, height 1.803 m (5' 10.98\"), SpO2 97 %.     Constitutional: Pleasant nad looks stated age head nc/at sclera clear   Lungs: ctab nl effort    Cardiovascular: RRR no m/r/g no le edema   Abdomen: Mild distension but otherwise s/nt/nd   Skin: W/d no c/c    Other: Alert and oriented affect appropriate ambulating without difficulty          Discharge Time:      Less than 30 minutes.        Image Results From This Hospital Stay (For Non-EPIC Providers):  "       Results for orders placed or performed during the hospital encounter of 09/11/17   Abdomen US, limited (RUQ only)    Narrative    RIGHT UPPER QUADRANT ULTRASOUND  9/11/2017  4:35 PM    HISTORY: Right upper quadrant/epigastric pain.    COMPARISON: None.    FINDINGS:   Gallbladder: Multiple gallstones and sludge. Negative sonographic  Adam's sign, although patient has taken pain medication. There is  curvilinear echogenicity at the gallbladder fundus which could be  calcification of the wall of the gallbladder fundus or a stone against  the fundal portion of the gallbladder.    Bile ducts: 0.8 cm common hepatic duct is dilated.     Liver: No focal liver lesions identified.    Pancreas: Pancreatic head and body grossly normal but not optimally  visualized. Pancreatic tail obscured.    Right kidney: Normal 11.8 cm.      Impression    IMPRESSION: Cholelithiasis and gallbladder sludge, dilated 0.8 cm  common hepatic duct. Gallbladder mildly distended 12 cm in length.  Borderline gallbladder wall thickening 0.4 cm. Cannot rule out early  cholecystitis, although patient took pain medication and sonographic  Adam's sign is negative.    YESI DALE MD   XR ERCP    Narrative    This exam was marked as non-reportable because it will not be read by a   radiologist or a [place] non-radiologist provider.             CT Abdomen Pelvis w Contrast    Narrative    CT ABDOMEN/PELVIS WITH CONTRAST September 13, 2017 1:13 PM     HISTORY: Abdominal pain status post ERCP yesterday. Abnormal liver  function tests. Cholelithiasis. Presumed pancreatitis.    TECHNIQUE: 100mL Isovue-370. Radiation dose for this scan was reduced  using automated exposure control, adjustment of the mA and/or kV  according to patient size, or iterative reconstruction technique.    COMPARISON: 6/2/2015.    FINDINGS:   Abdomen: Infiltrates in the lung bases consistent with atelectasis.  Small left pleural effusion. Strand-like densities in the fat  around  the pancreas especially the body and tail are consistent with  pancreatitis. These extend into the small bowel mesentery. Similar  strand-like densities are noted bilaterally in the retroperitoneum  marginating the psoas muscles and the quadratus lumborum muscles. No  evidence of pancreatic pseudocyst. Mild nonspecific dilatation of the  duodenum probably indicates ileus.    Gallbladder is normal in size with mural edema and cholelithiasis. The  liver, spleen, and adrenal glands appear normal. Tiny cyst laterally  in the right kidney. Left kidney is enlarged with either peripelvic  cysts or ureteropelvic junction obstruction and dilated collecting  system. The stone is seen in the lower pole collecting system on the  left, unchanged. The stomach, small bowel and colon otherwise appear  normal.    Pelvis: Bladder, prostate and seminal vesicles appear normal. Small  bilateral inguinal hernias containing fat. Small amount of free fluid  in the peritoneum in the cul-de-sac. No bone lesions.      Impression    IMPRESSION:  1. Mild acute pancreatitis centered in the pancreatic tail. This  counts for ileus and bibasilar atelectasis and left pleural effusion  as well.  2. Cholelithiasis and mural edema in the gallbladder wall but no  gallbladder distention.  3. Left renal peripelvic cysts versus ureteropelvic junction  obstruction, with a small nonobstructive stone in a posterior left  lower pole renal calyx. This is unchanged.    JASON SOTO MD   ERCP  Impression:               - - Choledocholithiasis was found. Complete removal                             was accomplished by biliary sphincterotomy and                             balloon extraction.                             -   Recommendation:           - Return patient to hospital [facility] for ongoing care.                             - Clear liquid diet today.                             - Elective cholecystectomy per surgery                                                                                Most Recent Lab Results In EPIC (For Non-EPIC Providers):    Most Recent 3 CBC's:  Recent Labs   Lab Test  09/16/17   0643  09/14/17   0711  09/13/17   0648   WBC  15.0*  18.5*  19.5*   HGB  12.1*  13.2*  14.3   MCV  85  84  83   PLT  305  249  279      Most Recent 3 BMP's:  Recent Labs   Lab Test  09/16/17   0643  09/14/17   0711  09/13/17   1435   NA  137  139  138   POTASSIUM  3.2*  3.7  4.3   CHLORIDE  104  107  105   CO2  24  25  26   BUN  8  12  13   CR  0.71  0.70  0.78   ANIONGAP  9  7  7   MONICA  8.5  8.3*  8.4*   GLC  85  99  90       Most Recent 2 LFT's:  Recent Labs   Lab Test  09/16/17   0643  09/14/17   0711   AST  27  48*   ALT  123*  234*   ALKPHOS  143  174*   BILITOTAL  1.8*  2.5*

## 2017-09-16 NOTE — PLAN OF CARE
Problem: Discharge Planning  Goal: Discharge Planning (Adult, OB, Behavioral, Peds)  Outcome: Adequate for Discharge Date Met:  09/16/17  Writer reviewed paperwork with pt and pt's wife including f/u appointments and low fat diet education. Pt verbalized understanding of information and had no further questions/concerns at time of discharge. Pt discharged at 1430 with wife for transportation.

## 2017-09-16 NOTE — PLAN OF CARE
Problem: Goal Outcome Summary  Goal: Goal Outcome Summary  Outcome: Improving  Max temp 100.1 and tachy other vital signs stable.   Denies any pain/nausea.   PIV D5 1/2 NS with KCl 75/hr, continues IV Unasyn.   Up independently, voiding without difficulty.   Possible d/c home today.

## 2017-09-16 NOTE — PLAN OF CARE
Problem: Goal Outcome Summary  Goal: Goal Outcome Summary  Outcome: Improving  No complaints of pain or nausea. Ambulating halls independently. Poor po, clear liquids.

## 2017-09-16 NOTE — PROGRESS NOTES
"GASTROENTEROLOGY PROGRESS NOTE       SUBJECTIVE:  Feels fine now.  Had a twinge of right side pain earlier and also mild stomach upset earlier which has resolved.  Passing gas and stool.     OBJECTIVE:  BP (!) 137/94 (BP Location: Left arm)  Pulse 103  Temp 98.6  F (37  C) (Oral)  Resp 16  Ht 1.803 m (5' 10.98\")  SpO2 97%  Temp (24hrs), Av.2  F (37.3  C), Min:98.2  F (36.8  C), Max:100.1  F (37.8  C)    No data found.      Intake/Output Summary (Last 24 hours) at 17 1142  Last data filed at 17 1004   Gross per 24 hour   Intake             3634 ml   Output             1595 ml   Net             2039 ml        PHYSICAL EXAM     Cardiovascular: Normal  Respiratory: Normal  Gastrointestinal: Active BS, soft, NT/ND        Recent Labs   Lab Test  17   0643  17   0711  17   0648   WBC  15.0*  18.5*  19.5*   HGB  12.1*  13.2*  14.3   MCV  85  84  83   PLT  305  249  279     Recent Labs   Lab Test  17   0643  17   0711  17   1435   POTASSIUM  3.2*  3.7  4.3   CHLORIDE  104  107  105   CO2  24  25  26   BUN  8  12  13   ANIONGAP  9  7  7     Recent Labs   Lab Test  17   0643  17   0711  17   1435  17   0648   06/01/15   1057  05/19/15   0839   ALBUMIN  2.4*  2.7*  2.7*  3.0*   < >  4.2  4.1   BILITOTAL  1.8*  2.5*  2.5*  2.9*   < >  0.8  0.8   ALT  123*  234*  296*  359*   < >  39  47   AST  27  48*  65*  88*   < >  18  13   LIPASE  421*  1114*   --   3491*   < >  154  147   AMYLASE   --    --    --    --    --   66  57    < > = values in this interval not displayed.           Active Problems:    Choledocholithiasis    Assessment: Had ERCP with removal of a stone.  No evidence of cholangitis.    Plan: Agree that abx can be stopped.  Low fat diet until gallbladder removed on the .    Pancreatitis    Assessment: Improving.    Plan: Advance diet slowly.  OK for discharge today.          Ton Flores MD  [facility]  Office:  " [phone]

## 2017-09-18 ENCOUNTER — TELEPHONE (OUTPATIENT)
Dept: INTERNAL MEDICINE | Facility: CLINIC | Age: 47
End: 2017-09-18

## 2017-09-18 NOTE — TELEPHONE ENCOUNTER
ED / Discharge Outreach Protocol    Patient Contact    Attempt # 1    Was call answered?  No.  Left message on voicemail with information to call me back.

## 2017-09-18 NOTE — TELEPHONE ENCOUNTER
"ED/Discharge Protocol    \"Hi, my name is Lyndsey Payne, a registered nurse, and I am calling on behalf of Dr. Henderson 's office at [facility].  I am calling to follow up and see how things are going for you after your recent visit.\"    \"I see that you were in the hospital for Choledocholithiasis.    How are you doing now that you are home?\" Patient stated his pain is well managed. However, he is having tempeture fluctuations. Per patient, the same concern was noted in the hospital where he will be 100 and then down to 98. Patient was not d.c on any abx at that time. Patient stated he is feeling well over all.     Is patient experiencing symptoms that may require a hospital visit?  No    Discharge Instructions    \"Let's review your discharge instructions.  What is/are the follow-up recommendations?  Pt. Response: got disconnected    \"Were you instructed to make a follow-up appointment?\"  Pt. Response: yes, writer was disconnected from phone call. Writer called back and left a vm for patient to call and schedule an appointment to be seen by PCP.     \"When you see the provider, I would recommend that you bring your discharge instructions with you.    Medications    \"How many new medications are you on since your hospitalization/ED visit?\"    0-1  \"How many of your current medicines changed (dose, timing, name, etc.) while you were in the hospital/ED visit?\"   0-1  \"Do you have questions about your medications?\"   No  \"Were you newly diagnosed with heart failure, COPD, diabetes or did you have a heart attack?\"   No  For patients on insulin: \"Did you start on insulin in the hospital or did you have your insulin dose changed?\"   No    Medication reconciliation completed? No, due to phone call getting disconnected    Was MTM referral placed (*Make sure to put transitions as reason for referral)?   No    Call Summary    \"Do you have any questions or concerns about your condition or care plan at the moment?\"    No  Triage " "nurse advice given: see above    Patient was in ER 2 in the past year (assess appropriateness of ER visits.)      \"If you have questions or things don't continue to improve, we encourage you contact us through the main clinic number,  [phone].  Even if the clinic is not open, triage nurses are available 24/7 to help you.     We would like you to know that our clinic has extended hours (provide information).  We also have urgent care (provide details on closest location and hours/contact info)\"      \"Thank you for your time and take care!\"        "

## 2017-09-20 ENCOUNTER — OFFICE VISIT (OUTPATIENT)
Dept: INTERNAL MEDICINE | Facility: CLINIC | Age: 47
End: 2017-09-20
Payer: COMMERCIAL

## 2017-09-20 VITALS
SYSTOLIC BLOOD PRESSURE: 124 MMHG | WEIGHT: 200.4 LBS | DIASTOLIC BLOOD PRESSURE: 90 MMHG | TEMPERATURE: 98 F | HEIGHT: 70 IN | BODY MASS INDEX: 28.69 KG/M2 | OXYGEN SATURATION: 97 % | HEART RATE: 94 BPM

## 2017-09-20 DIAGNOSIS — Z01.818 PREOP GENERAL PHYSICAL EXAM: Primary | ICD-10-CM

## 2017-09-20 DIAGNOSIS — K80.50 CHOLEDOCHOLITHIASIS: ICD-10-CM

## 2017-09-20 DIAGNOSIS — K85.10 ACUTE BILIARY PANCREATITIS WITHOUT INFECTION OR NECROSIS: ICD-10-CM

## 2017-09-20 LAB
ALBUMIN SERPL-MCNC: 3 G/DL (ref 3.4–5)
ALP SERPL-CCNC: 131 U/L (ref 40–150)
ALT SERPL W P-5'-P-CCNC: 177 U/L (ref 0–70)
ANION GAP SERPL CALCULATED.3IONS-SCNC: 7 MMOL/L (ref 3–14)
AST SERPL W P-5'-P-CCNC: 62 U/L (ref 0–45)
BASOPHILS # BLD AUTO: 0.1 10E9/L (ref 0–0.2)
BASOPHILS NFR BLD AUTO: 0.6 %
BILIRUB SERPL-MCNC: 0.8 MG/DL (ref 0.2–1.3)
BUN SERPL-MCNC: 7 MG/DL (ref 7–30)
CALCIUM SERPL-MCNC: 9.5 MG/DL (ref 8.5–10.1)
CHLORIDE SERPL-SCNC: 103 MMOL/L (ref 94–109)
CO2 SERPL-SCNC: 29 MMOL/L (ref 20–32)
CREAT SERPL-MCNC: 0.85 MG/DL (ref 0.66–1.25)
DIFFERENTIAL METHOD BLD: ABNORMAL
EOSINOPHIL # BLD AUTO: 0.2 10E9/L (ref 0–0.7)
EOSINOPHIL NFR BLD AUTO: 2.4 %
ERYTHROCYTE [DISTWIDTH] IN BLOOD BY AUTOMATED COUNT: 13.4 % (ref 10–15)
GFR SERPL CREATININE-BSD FRML MDRD: >90 ML/MIN/1.7M2
GLUCOSE SERPL-MCNC: 89 MG/DL (ref 70–99)
HCT VFR BLD AUTO: 44.7 % (ref 40–53)
HGB BLD-MCNC: 14.7 G/DL (ref 13.3–17.7)
LYMPHOCYTES # BLD AUTO: 1.3 10E9/L (ref 0.8–5.3)
LYMPHOCYTES NFR BLD AUTO: 14.4 %
MCH RBC QN AUTO: 27.9 PG (ref 26.5–33)
MCHC RBC AUTO-ENTMCNC: 32.9 G/DL (ref 31.5–36.5)
MCV RBC AUTO: 85 FL (ref 78–100)
MONOCYTES # BLD AUTO: 1.3 10E9/L (ref 0–1.3)
MONOCYTES NFR BLD AUTO: 13.9 %
NEUTROPHILS # BLD AUTO: 6.2 10E9/L (ref 1.6–8.3)
NEUTROPHILS NFR BLD AUTO: 68.7 %
PLATELET # BLD AUTO: 457 10E9/L (ref 150–450)
POTASSIUM SERPL-SCNC: 4.4 MMOL/L (ref 3.4–5.3)
PROT SERPL-MCNC: 7.8 G/DL (ref 6.8–8.8)
RBC # BLD AUTO: 5.27 10E12/L (ref 4.4–5.9)
SODIUM SERPL-SCNC: 139 MMOL/L (ref 133–144)
WBC # BLD AUTO: 9.1 10E9/L (ref 4–11)

## 2017-09-20 PROCEDURE — 85025 COMPLETE CBC W/AUTO DIFF WBC: CPT | Performed by: PHYSICIAN ASSISTANT

## 2017-09-20 PROCEDURE — 99215 OFFICE O/P EST HI 40 MIN: CPT | Performed by: PHYSICIAN ASSISTANT

## 2017-09-20 PROCEDURE — 80053 COMPREHEN METABOLIC PANEL: CPT | Performed by: PHYSICIAN ASSISTANT

## 2017-09-20 PROCEDURE — 36415 COLL VENOUS BLD VENIPUNCTURE: CPT | Performed by: PHYSICIAN ASSISTANT

## 2017-09-20 NOTE — PROGRESS NOTES
[address] [address]  [phone]  Dept: [phone]    PRE-OP EVALUATION:  Today's date: 2017    Edward Nessa (: 1970) presents for pre-operative evaluation assessment as requested by Dr. Olguin.  He requires evaluation and anesthesia risk assessment prior to undergoing surgery/procedure for treatment of choledocholithasis .  Proposed procedure: laparoscopic cholecystectomy     Date of Surgery/ Procedure:   Time of Surgery/ Procedure: 10:20  Hospital/Surgical Facility: [facility]    Primary Physician: Darren Henderson  Type of Anesthesia Anticipated: General    Patient has a Health Care Directive or Living Will:  NO    Preop Questions 2017   1.  Do you have a history of heart attack, stroke, stent, bypass or surgery on an artery in the head, neck, heart or legs? No   2.  Do you ever have any pain or discomfort in your chest? No   3.  Do you have a history of  Heart Failure? No   4.   Are you troubled by shortness of breath when:  walking on a level surface, or up a slight hill, or at night? No   5.  Do you currently have a cold, bronchitis or other respiratory infection? No   6.  Do you have a cough, shortness of breath, or wheezing? No   7.  Do you sometimes get pains in the calves of your legs when you walk? No   8. Do you or anyone in your family have previous history of blood clots? No   9.  Do you or does anyone in your family have a serious bleeding problem such as prolonged bleeding following surgeries or cuts? No   10. Have you ever had problems with anemia or been told to take iron pills? No   11. Have you had any abnormal blood loss such as black, tarry or bloody stools? No   12. Have you ever had a blood transfusion? No   13. Have you or any of your relatives ever had problems with anesthesia? No   14. Do you have sleep apnea, excessive snoring or daytime drowsiness? No   15. Do you have any prosthetic heart valves?  No   16. Do you have prosthetic joints? No           HPI:                                                      Brief HPI related to upcoming procedure: recent hospitalization for choledocholithiasis, pancreatitis.     Summary of Stay: Edward Szymanski is a 47 year old male without significant past medical history   admitted on 9/11/2017 with abdominal pain 2/2 gallstone pancreatitis complicated by ileus  Surgery and GI were involved in his care and his symptoms have dramatically improved after ERCP.  Plans at this time are for elective cholecystectomy on 9/29/17       He was initiated on clears on the belle of 9/14 but didn't tolerate due to bloating, so held off but then was tolerating it without difficulty on 9/15/17 so then adat to low fat with a discussion regarding choosing wisely.  He has done well, he continues to eat small amounts of low fat foods with good fluid input.  He feels well and wishes to discharge today, GI agrees.  Also in discussion with GI and surgery, no need for further antibiotics at discharge  Problem List:   1. Gallstone pancreatitis:  S/p ERCP with dramatic improvement in symptoms. adat to low fat, plan for cholecystectomy on 9/29. Appreciate GI and surgery input. He is tolerating low fat diet without difficulty, no indication for antibiotics at discharge in consultation with Dr. Clayton and Dr. Flores  2. Ileus:  2/2 to above, resolved  DVT Prophylaxis: Pneumatic Compression Devices  Code Status: Full Code  Discharge Dispo: home      See problem list for active medical problems.  Problems all longstanding and stable, except as noted/documented.  See ROS for pertinent symptoms related to these conditions.                                                                                                  .    MEDICAL HISTORY:                                                    Patient Active Problem List    Diagnosis Date Noted     Choledocholithiasis 09/11/2017     Priority: Medium     Pancreatitis  09/11/2017     Priority: Medium     Generalized anxiety disorder 09/02/2011     Priority: Medium     HYPERLIPIDEMIA LDL GOAL <130 10/31/2010     Priority: Medium      Past Medical History:   Diagnosis Date     Allergic rhinitis      High cholesterol      Hyperlipidemia      Past Surgical History:   Procedure Laterality Date     ENDOSCOPIC RETROGRADE CHOLANGIOPANCREATOGRAM N/A 9/12/2017    Procedure: ENDOSCOPIC RETROGRADE CHOLANGIOPANCREATOGRAM;  ENDOSCOPIC RETROGRADE CHOLANGIOPANCREATOGRAM with sphincterotomy and stone extraction;  Surgeon: Shiloh James MD;  Location:  OR     NO HISTORY OF SURGERY       Current Outpatient Prescriptions   Medication Sig Dispense Refill     cetirizine (ZYRTEC) 10 MG tablet Take 10 mg by mouth daily as needed for allergies       budesonide (RHINOCORT ALLERGY) 32 MCG/ACT spray Spray 1 spray into both nostrils daily as needed for rhinitis or allergies       ibuprofen (ADVIL/MOTRIN) 200 MG tablet Take 400 mg by mouth 3 times daily as needed for mild pain       OTC products: None, except as noted above    No Known Allergies   Latex Allergy: NO    Social History   Substance Use Topics     Smoking status: Never Smoker     Smokeless tobacco: Never Used     Alcohol use Yes      Comment: Socially     History   Drug Use No       REVIEW OF SYSTEMS:                                                    C: NEGATIVE for fever, chills, change in weight  INTEGUMENTARY/SKIN: NEGATIVE for worrisome rashes, moles or lesions  EYES: NEGATIVE for vision changes or irritation  E/M: NEGATIVE for ear, mouth and throat problems  R: NEGATIVE for significant cough or SOB  CV: NEGATIVE for chest pain, palpitations or peripheral edema  GI: NEGATIVE for hematemesis, hematochezia, nausea and vomiting and abdominal pain improved  MUSCULOSKELETAL: NEGATIVE for significant arthralgias or myalgia  NEURO: NEGATIVE for weakness, dizziness or paresthesias  ENDOCRINE: NEGATIVE for temperature intolerance,  "skin/hair changes  HEME/ALLERGY/IMMUNE: NEGATIVE for bleeding problems  PSYCHIATRIC: NEGATIVE for changes in mood or affect  ROS otherwise negative    EXAM:                                                    /90 (BP Location: Left arm, Patient Position: Chair, Cuff Size: Adult Large)  Pulse 94  Temp 98  F (36.7  C) (Oral)  Ht 5' 10\" (1.778 m)  Wt 200 lb 6.4 oz (90.9 kg)  SpO2 97%  BMI 28.75 kg/m2  GENERAL APPEARANCE: healthy, alert and no distress  EYES: Eyes grossly normal to inspection, PERRL and conjunctivae and sclerae normal  HENT: ear canals and TM's normal and nose and mouth without ulcers or lesions  RESP: lungs clear to auscultation - no rales, rhonchi or wheezes  CV: regular rate and rhythm, normal S1 S2, no S3 or S4 and no murmur, click or rub   ABDOMEN: soft, nontender, no HSM or masses and bowel sounds normal  MS: extremities normal- no gross deformities noted  SKIN: no suspicious lesions or rashes  NEURO: Normal strength and tone, sensory exam grossly normal, mentation intact and speech normal  PSYCH: mentation appears normal and affect normal/bright    DIAGNOSTICS:                                                      EKG: Not indicated due to non-vascular surgery and low risk of event (age <65 and without cardiac risk factors)  Labs Resulted Today:   Results for orders placed or performed in visit on 09/20/17   CBC with platelets differential   Result Value Ref Range    WBC 9.1 4.0 - 11.0 10e9/L    RBC Count 5.27 4.4 - 5.9 10e12/L    Hemoglobin 14.7 13.3 - 17.7 g/dL    Hematocrit 44.7 40.0 - 53.0 %    MCV 85 78 - 100 fl    MCH 27.9 26.5 - 33.0 pg    MCHC 32.9 31.5 - 36.5 g/dL    RDW 13.4 10.0 - 15.0 %    Platelet Count 457 (H) 150 - 450 10e9/L    Diff Method Automated Method     % Neutrophils 68.7 %    % Lymphocytes 14.4 %    % Monocytes 13.9 %    % Eosinophils 2.4 %    % Basophils 0.6 %    Absolute Neutrophil 6.2 1.6 - 8.3 10e9/L    Absolute Lymphocytes 1.3 0.8 - 5.3 10e9/L    Absolute " Monocytes 1.3 0.0 - 1.3 10e9/L    Absolute Eosinophils 0.2 0.0 - 0.7 10e9/L    Absolute Basophils 0.1 0.0 - 0.2 10e9/L     Labs Drawn and in Process:   Unresulted Labs Ordered in the Past 30 Days of this Admission     Date and Time Order Name Status Description    9/20/2017 0906 COMPREHENSIVE METABOLIC PANEL In process           Recent Labs   Lab Test  09/16/17   0643  09/14/17   0711   05/15/15   2336   HGB  12.1*  13.2*   < >  15.8   PLT  305  249   < >  238   NA  137  139   < >  139   POTASSIUM  3.2*  3.7   < >  3.9   CR  0.71  0.70   < >  1.09   A1C   --    --    --   5.2    < > = values in this interval not displayed.        IMPRESSION:                                                    Reason for surgery/procedure: Choledocholithiasis, acute biliary pancreatitis   Diagnosis/reason for consult: cardiopulmonary and clearance for surgery     The proposed surgical procedure is considered INTERMEDIATE risk.    REVISED CARDIAC RISK INDEX  The patient has the following serious cardiovascular risks for perioperative complications such as (MI, PE, VFib and 3  AV Block):  No serious cardiac risks  INTERPRETATION: 0 risks: Class I (very low risk - 0.4% complication rate)    The patient has the following additional risks for perioperative complications:  No identified additional risks      ICD-10-CM    1. Preop general physical exam Z01.818 CBC with platelets differential     Comprehensive metabolic panel   2. Choledocholithiasis K80.50 CBC with platelets differential     Comprehensive metabolic panel   3. Acute biliary pancreatitis without infection or necrosis K85.10 CBC with platelets differential     Comprehensive metabolic panel       RECOMMENDATIONS:                                                          --Patient is to take all scheduled medications on the day of surgery EXCEPT for modifications listed below.    Anticoagulant or Antiplatelet Medication Use  NSAIDS: Naproxen (Naprosyn):   Stop 2-3 days prior to  surgery          APPROVAL GIVEN to proceed with proposed procedure, without further diagnostic evaluation       Signed Electronically by: Mila Hadley PA-C    Copy of this evaluation report is provided to requesting physician.    [facility] Preop Guidelines

## 2017-09-20 NOTE — MR AVS SNAPSHOT
After Visit Summary   9/20/2017    Edward Szymanski    MRN: [id number]           Patient Information     Date Of Birth          1970        Visit Information        Provider Department      9/20/2017 9:00 AM Mila Hadley PA-C [facility]        Today's Diagnoses     Preop general physical exam    -  1    Choledocholithiasis        Acute biliary pancreatitis without infection or necrosis          Care Instructions      Before Your Surgery      Call your surgeon if there is any change in your health. This includes signs of a cold or flu (such as a sore throat, runny nose, cough, rash or fever).    Do not smoke, drink alcohol or take over the counter medicine (unless your surgeon or primary care doctor tells you to) for the 24 hours before and after surgery.    If you take prescribed drugs: Follow your doctor s orders about which medicines to take and which to stop until after surgery.    Eating and drinking prior to surgery: follow the instructions from your surgeon    Take a shower or bath the night before surgery. Use the soap your surgeon gave you to gently clean your skin. If you do not have soap from your surgeon, use your regular soap. Do not shave or scrub the surgery site.  Wear clean pajamas and have clean sheets on your bed.           Follow-ups after your visit        Your next 10 appointments already scheduled     Sep 29, 2017   Procedure with Demond Zhou MD   [facility] (--)    [address] [facility] [address]   [phone]            Sep 29, 2017 10:15 AM CDT   [facility] Same Day Surgery with Demond Zhou MD, Sosa Mancia PA-C   [facility] ([facility])    [facility] ([facility])   [phone]              Who to contact     If you have questions or need follow up information about today's clinic visit or your  "schedule please contact [facility] directly at [phone].  Normal or non-critical lab and imaging results will be communicated to you by MyChart, letter or phone within 4 business days after the clinic has received the results. If you do not hear from us within 7 days, please contact the clinic through [employer] or phone. If you have a critical or abnormal lab result, we will notify you by phone as soon as possible.  Submit refill requests through [employer] or call your pharmacy and they will forward the refill request to us. Please allow 3 business days for your refill to be completed.          Additional Information About Your Visit        [employer] Information     [employer] gives you secure access to your electronic health record. If you see a primary care provider, you can also send messages to your care team and make appointments. If you have questions, please call your primary care clinic.  If you do not have a primary care provider, please call [phone] and they will assist you.        Care EveryWhere ID     This is your Care EveryWhere ID. This could be used by other organizations to access your [place] medical records  [id number]        Your Vitals Were     Pulse Temperature Height Pulse Oximetry BMI (Body Mass Index)       94 98  F (36.7  C) (Oral) 5' 10\" (1.778 m) 97% 28.75 kg/m2        Blood Pressure from Last 3 Encounters:   09/20/17 124/90   09/16/17 (!) 137/94   09/09/17 (!) 147/95    Weight from Last 3 Encounters:   09/20/17 200 lb 6.4 oz (90.9 kg)   09/09/17 205 lb (93 kg)   08/05/15 208 lb 3.2 oz (94.4 kg)              We Performed the Following     CBC with platelets differential     Comprehensive metabolic panel        Primary Care Provider Office Phone # Fax #    Darren Henderson [phone] [address] [address]        Equal Access to Services     OMID LOPEZ AH: Cayetano caldwello Sokacy, waaxda luqadaha, qaybta kaalmada " natalya gibsonpollo andradebraulio guptaaan ah. [facility] [phone].    ATENCIÓN: Si valla edilma, tiene a bell disposición servicios gratuitos de asistencia lingüística. [address] [phone].    We comply with applicable federal civil rights laws and [place] laws. We do not discriminate on the basis of race, color, national origin, age, disability sex, sexual orientation or gender identity.            Thank you!     Thank you for choosing [facility]  for your care. Our goal is always to provide you with excellent care. Hearing back from our patients is one way we can continue to improve our services. Please take a few minutes to complete the written survey that you may receive in the mail after your visit with us. Thank you!             Your Updated Medication List - Protect others around you: Learn how to safely use, store and throw away your medicines at www.disposemymeds.org.          This list is accurate as of: 9/20/17 10:42 AM.  Always use your most recent med list.                   Brand Name Dispense Instructions for use Diagnosis    cetirizine 10 MG tablet    zyrTEC     Take 10 mg by mouth daily as needed for allergies        ibuprofen 200 MG tablet    ADVIL/MOTRIN     Take 400 mg by mouth 3 times daily as needed for mild pain        RHINOCORT ALLERGY 32 MCG/ACT spray   Generic drug:  budesonide      Spray 1 spray into both nostrils daily as needed for rhinitis or allergies

## 2017-09-20 NOTE — NURSING NOTE
"Chief Complaint   Patient presents with     Pre-Op Exam     09/29 laparoscopic cholecystectomy ridges        Initial /90 (BP Location: Left arm, Patient Position: Chair, Cuff Size: Adult Large)  Pulse 94  Temp 98  F (36.7  C) (Oral)  Ht 5' 10\" (1.778 m)  Wt 200 lb 6.4 oz (90.9 kg)  SpO2 97%  BMI 28.75 kg/m2 Estimated body mass index is 28.75 kg/(m^2) as calculated from the following:    Height as of this encounter: 5' 10\" (1.778 m).    Weight as of this encounter: 200 lb 6.4 oz (90.9 kg).  Medication Reconciliation: complete    "

## 2017-09-28 NOTE — H&P (VIEW-ONLY)
[address] [address]  [phone]  Dept: [phone]    PRE-OP EVALUATION:  Today's date: 2017    Edward Nessa (: 1970) presents for pre-operative evaluation assessment as requested by Dr. Olguin.  He requires evaluation and anesthesia risk assessment prior to undergoing surgery/procedure for treatment of choledocholithasis .  Proposed procedure: laparoscopic cholecystectomy     Date of Surgery/ Procedure:   Time of Surgery/ Procedure: 10:20  Hospital/Surgical Facility: [facility]    Primary Physician: Darren Henderson  Type of Anesthesia Anticipated: General    Patient has a Health Care Directive or Living Will:  NO    Preop Questions 2017   1.  Do you have a history of heart attack, stroke, stent, bypass or surgery on an artery in the head, neck, heart or legs? No   2.  Do you ever have any pain or discomfort in your chest? No   3.  Do you have a history of  Heart Failure? No   4.   Are you troubled by shortness of breath when:  walking on a level surface, or up a slight hill, or at night? No   5.  Do you currently have a cold, bronchitis or other respiratory infection? No   6.  Do you have a cough, shortness of breath, or wheezing? No   7.  Do you sometimes get pains in the calves of your legs when you walk? No   8. Do you or anyone in your family have previous history of blood clots? No   9.  Do you or does anyone in your family have a serious bleeding problem such as prolonged bleeding following surgeries or cuts? No   10. Have you ever had problems with anemia or been told to take iron pills? No   11. Have you had any abnormal blood loss such as black, tarry or bloody stools? No   12. Have you ever had a blood transfusion? No   13. Have you or any of your relatives ever had problems with anesthesia? No   14. Do you have sleep apnea, excessive snoring or daytime drowsiness? No   15. Do you have any prosthetic heart valves?  No   16. Do you have prosthetic joints? No           HPI:                                                      Brief HPI related to upcoming procedure: recent hospitalization for choledocholithiasis, pancreatitis.     Summary of Stay: Edward Szymanski is a 47 year old male without significant past medical history   admitted on 9/11/2017 with abdominal pain 2/2 gallstone pancreatitis complicated by ileus  Surgery and GI were involved in his care and his symptoms have dramatically improved after ERCP.  Plans at this time are for elective cholecystectomy on 9/29/17       He was initiated on clears on the belle of 9/14 but didn't tolerate due to bloating, so held off but then was tolerating it without difficulty on 9/15/17 so then adat to low fat with a discussion regarding choosing wisely.  He has done well, he continues to eat small amounts of low fat foods with good fluid input.  He feels well and wishes to discharge today, GI agrees.  Also in discussion with GI and surgery, no need for further antibiotics at discharge  Problem List:   1. Gallstone pancreatitis:  S/p ERCP with dramatic improvement in symptoms. adat to low fat, plan for cholecystectomy on 9/29. Appreciate GI and surgery input. He is tolerating low fat diet without difficulty, no indication for antibiotics at discharge in consultation with Dr. Clayton and Dr. Flores  2. Ileus:  2/2 to above, resolved  DVT Prophylaxis: Pneumatic Compression Devices  Code Status: Full Code  Discharge Dispo: home      See problem list for active medical problems.  Problems all longstanding and stable, except as noted/documented.  See ROS for pertinent symptoms related to these conditions.                                                                                                  .    MEDICAL HISTORY:                                                    Patient Active Problem List    Diagnosis Date Noted     Choledocholithiasis 09/11/2017     Priority: Medium     Pancreatitis  09/11/2017     Priority: Medium     Generalized anxiety disorder 09/02/2011     Priority: Medium     HYPERLIPIDEMIA LDL GOAL <130 10/31/2010     Priority: Medium      Past Medical History:   Diagnosis Date     Allergic rhinitis      High cholesterol      Hyperlipidemia      Past Surgical History:   Procedure Laterality Date     ENDOSCOPIC RETROGRADE CHOLANGIOPANCREATOGRAM N/A 9/12/2017    Procedure: ENDOSCOPIC RETROGRADE CHOLANGIOPANCREATOGRAM;  ENDOSCOPIC RETROGRADE CHOLANGIOPANCREATOGRAM with sphincterotomy and stone extraction;  Surgeon: Shiloh James MD;  Location:  OR     NO HISTORY OF SURGERY       Current Outpatient Prescriptions   Medication Sig Dispense Refill     cetirizine (ZYRTEC) 10 MG tablet Take 10 mg by mouth daily as needed for allergies       budesonide (RHINOCORT ALLERGY) 32 MCG/ACT spray Spray 1 spray into both nostrils daily as needed for rhinitis or allergies       ibuprofen (ADVIL/MOTRIN) 200 MG tablet Take 400 mg by mouth 3 times daily as needed for mild pain       OTC products: None, except as noted above    No Known Allergies   Latex Allergy: NO    Social History   Substance Use Topics     Smoking status: Never Smoker     Smokeless tobacco: Never Used     Alcohol use Yes      Comment: Socially     History   Drug Use No       REVIEW OF SYSTEMS:                                                    C: NEGATIVE for fever, chills, change in weight  INTEGUMENTARY/SKIN: NEGATIVE for worrisome rashes, moles or lesions  EYES: NEGATIVE for vision changes or irritation  E/M: NEGATIVE for ear, mouth and throat problems  R: NEGATIVE for significant cough or SOB  CV: NEGATIVE for chest pain, palpitations or peripheral edema  GI: NEGATIVE for hematemesis, hematochezia, nausea and vomiting and abdominal pain improved  MUSCULOSKELETAL: NEGATIVE for significant arthralgias or myalgia  NEURO: NEGATIVE for weakness, dizziness or paresthesias  ENDOCRINE: NEGATIVE for temperature intolerance,  "skin/hair changes  HEME/ALLERGY/IMMUNE: NEGATIVE for bleeding problems  PSYCHIATRIC: NEGATIVE for changes in mood or affect  ROS otherwise negative    EXAM:                                                    /90 (BP Location: Left arm, Patient Position: Chair, Cuff Size: Adult Large)  Pulse 94  Temp 98  F (36.7  C) (Oral)  Ht 5' 10\" (1.778 m)  Wt 200 lb 6.4 oz (90.9 kg)  SpO2 97%  BMI 28.75 kg/m2  GENERAL APPEARANCE: healthy, alert and no distress  EYES: Eyes grossly normal to inspection, PERRL and conjunctivae and sclerae normal  HENT: ear canals and TM's normal and nose and mouth without ulcers or lesions  RESP: lungs clear to auscultation - no rales, rhonchi or wheezes  CV: regular rate and rhythm, normal S1 S2, no S3 or S4 and no murmur, click or rub   ABDOMEN: soft, nontender, no HSM or masses and bowel sounds normal  MS: extremities normal- no gross deformities noted  SKIN: no suspicious lesions or rashes  NEURO: Normal strength and tone, sensory exam grossly normal, mentation intact and speech normal  PSYCH: mentation appears normal and affect normal/bright    DIAGNOSTICS:                                                      EKG: Not indicated due to non-vascular surgery and low risk of event (age <65 and without cardiac risk factors)  Labs Resulted Today:   Results for orders placed or performed in visit on 09/20/17   CBC with platelets differential   Result Value Ref Range    WBC 9.1 4.0 - 11.0 10e9/L    RBC Count 5.27 4.4 - 5.9 10e12/L    Hemoglobin 14.7 13.3 - 17.7 g/dL    Hematocrit 44.7 40.0 - 53.0 %    MCV 85 78 - 100 fl    MCH 27.9 26.5 - 33.0 pg    MCHC 32.9 31.5 - 36.5 g/dL    RDW 13.4 10.0 - 15.0 %    Platelet Count 457 (H) 150 - 450 10e9/L    Diff Method Automated Method     % Neutrophils 68.7 %    % Lymphocytes 14.4 %    % Monocytes 13.9 %    % Eosinophils 2.4 %    % Basophils 0.6 %    Absolute Neutrophil 6.2 1.6 - 8.3 10e9/L    Absolute Lymphocytes 1.3 0.8 - 5.3 10e9/L    Absolute " Monocytes 1.3 0.0 - 1.3 10e9/L    Absolute Eosinophils 0.2 0.0 - 0.7 10e9/L    Absolute Basophils 0.1 0.0 - 0.2 10e9/L     Labs Drawn and in Process:   Unresulted Labs Ordered in the Past 30 Days of this Admission     Date and Time Order Name Status Description    9/20/2017 0906 COMPREHENSIVE METABOLIC PANEL In process           Recent Labs   Lab Test  09/16/17   0643  09/14/17   0711   05/15/15   2336   HGB  12.1*  13.2*   < >  15.8   PLT  305  249   < >  238   NA  137  139   < >  139   POTASSIUM  3.2*  3.7   < >  3.9   CR  0.71  0.70   < >  1.09   A1C   --    --    --   5.2    < > = values in this interval not displayed.        IMPRESSION:                                                    Reason for surgery/procedure: Choledocholithiasis, acute biliary pancreatitis   Diagnosis/reason for consult: cardiopulmonary and clearance for surgery     The proposed surgical procedure is considered INTERMEDIATE risk.    REVISED CARDIAC RISK INDEX  The patient has the following serious cardiovascular risks for perioperative complications such as (MI, PE, VFib and 3  AV Block):  No serious cardiac risks  INTERPRETATION: 0 risks: Class I (very low risk - 0.4% complication rate)    The patient has the following additional risks for perioperative complications:  No identified additional risks      ICD-10-CM    1. Preop general physical exam Z01.818 CBC with platelets differential     Comprehensive metabolic panel   2. Choledocholithiasis K80.50 CBC with platelets differential     Comprehensive metabolic panel   3. Acute biliary pancreatitis without infection or necrosis K85.10 CBC with platelets differential     Comprehensive metabolic panel       RECOMMENDATIONS:                                                          --Patient is to take all scheduled medications on the day of surgery EXCEPT for modifications listed below.    Anticoagulant or Antiplatelet Medication Use  NSAIDS: Naproxen (Naprosyn):   Stop 2-3 days prior to  surgery          APPROVAL GIVEN to proceed with proposed procedure, without further diagnostic evaluation       Signed Electronically by: Mila Hadley PA-C    Copy of this evaluation report is provided to requesting physician.    [place] Preop Guidelines

## 2017-09-29 ENCOUNTER — HOSPITAL ENCOUNTER (OUTPATIENT)
Facility: CLINIC | Age: 47
Discharge: HOME | End: 2017-09-29
Attending: SURGERY | Admitting: SURGERY
Payer: COMMERCIAL

## 2017-09-29 ENCOUNTER — ANESTHESIA (OUTPATIENT)
Dept: SURGERY | Facility: CLINIC | Age: 47
End: 2017-09-29
Payer: COMMERCIAL

## 2017-09-29 ENCOUNTER — ANESTHESIA EVENT (OUTPATIENT)
Dept: SURGERY | Facility: CLINIC | Age: 47
End: 2017-09-29
Payer: COMMERCIAL

## 2017-09-29 ENCOUNTER — APPOINTMENT (OUTPATIENT)
Dept: SURGERY | Facility: PHYSICIAN GROUP | Age: 47
End: 2017-09-29
Payer: COMMERCIAL

## 2017-09-29 ENCOUNTER — APPOINTMENT (OUTPATIENT)
Dept: GENERAL RADIOLOGY | Facility: CLINIC | Age: 47
End: 2017-09-29
Attending: SURGERY
Payer: COMMERCIAL

## 2017-09-29 VITALS
TEMPERATURE: 97.7 F | HEIGHT: 70 IN | RESPIRATION RATE: 14 BRPM | WEIGHT: 198.8 LBS | OXYGEN SATURATION: 98 % | BODY MASS INDEX: 28.46 KG/M2 | DIASTOLIC BLOOD PRESSURE: 88 MMHG | SYSTOLIC BLOOD PRESSURE: 126 MMHG

## 2017-09-29 DIAGNOSIS — K80.50 CHOLEDOCHOLITHIASIS: Primary | ICD-10-CM

## 2017-09-29 PROCEDURE — 40000306 ZZH STATISTIC PRE PROC ASSESS II: Performed by: SURGERY

## 2017-09-29 PROCEDURE — 47563 LAPARO CHOLECYSTECTOMY/GRAPH: CPT | Mod: AS | Performed by: PHYSICIAN ASSISTANT

## 2017-09-29 PROCEDURE — 47563 LAPARO CHOLECYSTECTOMY/GRAPH: CPT | Performed by: SURGERY

## 2017-09-29 PROCEDURE — 25000128 H RX IP 250 OP 636: Performed by: SURGERY

## 2017-09-29 PROCEDURE — 40000277 XR SURGERY CARM FLUORO LESS THAN 5 MIN W STILLS

## 2017-09-29 PROCEDURE — 88304 TISSUE EXAM BY PATHOLOGIST: CPT | Mod: 26 | Performed by: SURGERY

## 2017-09-29 PROCEDURE — 37000008 ZZH ANESTHESIA TECHNICAL FEE, 1ST 30 MIN: Performed by: SURGERY

## 2017-09-29 PROCEDURE — 37000009 ZZH ANESTHESIA TECHNICAL FEE, EACH ADDTL 15 MIN: Performed by: SURGERY

## 2017-09-29 PROCEDURE — 71000012 ZZH RECOVERY PHASE 1 LEVEL 1 FIRST HR: Performed by: SURGERY

## 2017-09-29 PROCEDURE — 27210995 ZZH RX 272: Performed by: SURGERY

## 2017-09-29 PROCEDURE — 88304 TISSUE EXAM BY PATHOLOGIST: CPT | Performed by: SURGERY

## 2017-09-29 PROCEDURE — 36000060 ZZH SURGERY LEVEL 3 W FLUORO 1ST 30 MIN: Performed by: SURGERY

## 2017-09-29 PROCEDURE — 25000128 H RX IP 250 OP 636: Performed by: NURSE ANESTHETIST, CERTIFIED REGISTERED

## 2017-09-29 PROCEDURE — 27210794 ZZH OR GENERAL SUPPLY STERILE: Performed by: SURGERY

## 2017-09-29 PROCEDURE — 71000013 ZZH RECOVERY PHASE 1 LEVEL 1 EA ADDTL HR: Performed by: SURGERY

## 2017-09-29 PROCEDURE — 36000058 ZZH SURGERY LEVEL 3 EA 15 ADDTL MIN: Performed by: SURGERY

## 2017-09-29 PROCEDURE — 25800025 ZZH RX 258: Performed by: SURGERY

## 2017-09-29 PROCEDURE — 25000128 H RX IP 250 OP 636: Performed by: ANESTHESIOLOGY

## 2017-09-29 PROCEDURE — 25000125 ZZHC RX 250: Performed by: NURSE ANESTHETIST, CERTIFIED REGISTERED

## 2017-09-29 PROCEDURE — 25000566 ZZH SEVOFLURANE, EA 15 MIN: Performed by: SURGERY

## 2017-09-29 PROCEDURE — 25000125 ZZHC RX 250: Performed by: SURGERY

## 2017-09-29 PROCEDURE — 71000027 ZZH RECOVERY PHASE 2 EACH 15 MINS: Performed by: SURGERY

## 2017-09-29 RX ORDER — DEXAMETHASONE SODIUM PHOSPHATE 4 MG/ML
INJECTION, SOLUTION INTRA-ARTICULAR; INTRALESIONAL; INTRAMUSCULAR; INTRAVENOUS; SOFT TISSUE PRN
Status: DISCONTINUED | OUTPATIENT
Start: 2017-09-29 | End: 2017-09-29

## 2017-09-29 RX ORDER — DROPERIDOL 2.5 MG/ML
0.62 INJECTION, SOLUTION INTRAMUSCULAR; INTRAVENOUS
Status: DISCONTINUED | OUTPATIENT
Start: 2017-09-29 | End: 2017-09-29 | Stop reason: HOSPADM

## 2017-09-29 RX ORDER — CEFAZOLIN SODIUM 1 G/3ML
1 INJECTION, POWDER, FOR SOLUTION INTRAMUSCULAR; INTRAVENOUS SEE ADMIN INSTRUCTIONS
Status: DISCONTINUED | OUTPATIENT
Start: 2017-09-29 | End: 2017-09-29 | Stop reason: HOSPADM

## 2017-09-29 RX ORDER — NEOSTIGMINE METHYLSULFATE 1 MG/ML
VIAL (ML) INJECTION PRN
Status: DISCONTINUED | OUTPATIENT
Start: 2017-09-29 | End: 2017-09-29

## 2017-09-29 RX ORDER — DEXAMETHASONE SODIUM PHOSPHATE 4 MG/ML
4 INJECTION, SOLUTION INTRA-ARTICULAR; INTRALESIONAL; INTRAMUSCULAR; INTRAVENOUS; SOFT TISSUE
Status: DISCONTINUED | OUTPATIENT
Start: 2017-09-29 | End: 2017-09-29 | Stop reason: HOSPADM

## 2017-09-29 RX ORDER — DIMENHYDRINATE 50 MG/ML
25 INJECTION, SOLUTION INTRAMUSCULAR; INTRAVENOUS
Status: DISCONTINUED | OUTPATIENT
Start: 2017-09-29 | End: 2017-09-29 | Stop reason: HOSPADM

## 2017-09-29 RX ORDER — ONDANSETRON 2 MG/ML
INJECTION INTRAMUSCULAR; INTRAVENOUS PRN
Status: DISCONTINUED | OUTPATIENT
Start: 2017-09-29 | End: 2017-09-29

## 2017-09-29 RX ORDER — OXYCODONE HYDROCHLORIDE 5 MG/1
5-10 TABLET ORAL
Status: DISCONTINUED | OUTPATIENT
Start: 2017-09-29 | End: 2017-09-29 | Stop reason: HOSPADM

## 2017-09-29 RX ORDER — FENTANYL CITRATE 50 UG/ML
25-50 INJECTION, SOLUTION INTRAMUSCULAR; INTRAVENOUS
Status: DISCONTINUED | OUTPATIENT
Start: 2017-09-29 | End: 2017-09-29 | Stop reason: HOSPADM

## 2017-09-29 RX ORDER — ONDANSETRON 4 MG/1
4 TABLET, ORALLY DISINTEGRATING ORAL EVERY 30 MIN PRN
Status: DISCONTINUED | OUTPATIENT
Start: 2017-09-29 | End: 2017-09-29 | Stop reason: HOSPADM

## 2017-09-29 RX ORDER — OXYCODONE HYDROCHLORIDE 5 MG/1
5-10 TABLET ORAL
Qty: 30 TABLET | Refills: 0 | Status: SHIPPED | OUTPATIENT
Start: 2017-09-29

## 2017-09-29 RX ORDER — HYDROMORPHONE HYDROCHLORIDE 1 MG/ML
.3-.5 INJECTION, SOLUTION INTRAMUSCULAR; INTRAVENOUS; SUBCUTANEOUS EVERY 5 MIN PRN
Status: DISCONTINUED | OUTPATIENT
Start: 2017-09-29 | End: 2017-09-29 | Stop reason: HOSPADM

## 2017-09-29 RX ORDER — FENTANYL CITRATE 50 UG/ML
INJECTION, SOLUTION INTRAMUSCULAR; INTRAVENOUS PRN
Status: DISCONTINUED | OUTPATIENT
Start: 2017-09-29 | End: 2017-09-29

## 2017-09-29 RX ORDER — PROPOFOL 10 MG/ML
INJECTION, EMULSION INTRAVENOUS PRN
Status: DISCONTINUED | OUTPATIENT
Start: 2017-09-29 | End: 2017-09-29

## 2017-09-29 RX ORDER — LIDOCAINE 40 MG/G
CREAM TOPICAL
Status: DISCONTINUED | OUTPATIENT
Start: 2017-09-29 | End: 2017-09-29 | Stop reason: HOSPADM

## 2017-09-29 RX ORDER — MEPERIDINE HYDROCHLORIDE 25 MG/ML
12.5 INJECTION INTRAMUSCULAR; INTRAVENOUS; SUBCUTANEOUS EVERY 5 MIN PRN
Status: DISCONTINUED | OUTPATIENT
Start: 2017-09-29 | End: 2017-09-29 | Stop reason: HOSPADM

## 2017-09-29 RX ORDER — KETOROLAC TROMETHAMINE 30 MG/ML
30 INJECTION, SOLUTION INTRAMUSCULAR; INTRAVENOUS
Status: COMPLETED | OUTPATIENT
Start: 2017-09-29 | End: 2017-09-29

## 2017-09-29 RX ORDER — CEFAZOLIN SODIUM 2 G/100ML
2 INJECTION, SOLUTION INTRAVENOUS
Status: COMPLETED | OUTPATIENT
Start: 2017-09-29 | End: 2017-09-29

## 2017-09-29 RX ORDER — ONDANSETRON 2 MG/ML
4 INJECTION INTRAMUSCULAR; INTRAVENOUS EVERY 30 MIN PRN
Status: DISCONTINUED | OUTPATIENT
Start: 2017-09-29 | End: 2017-09-29 | Stop reason: HOSPADM

## 2017-09-29 RX ORDER — LIDOCAINE HYDROCHLORIDE 10 MG/ML
INJECTION, SOLUTION INFILTRATION; PERINEURAL PRN
Status: DISCONTINUED | OUTPATIENT
Start: 2017-09-29 | End: 2017-09-29

## 2017-09-29 RX ORDER — SODIUM CHLORIDE, SODIUM LACTATE, POTASSIUM CHLORIDE, CALCIUM CHLORIDE 600; 310; 30; 20 MG/100ML; MG/100ML; MG/100ML; MG/100ML
INJECTION, SOLUTION INTRAVENOUS CONTINUOUS
Status: DISCONTINUED | OUTPATIENT
Start: 2017-09-29 | End: 2017-09-29 | Stop reason: HOSPADM

## 2017-09-29 RX ORDER — GLYCOPYRROLATE 0.2 MG/ML
INJECTION, SOLUTION INTRAMUSCULAR; INTRAVENOUS PRN
Status: DISCONTINUED | OUTPATIENT
Start: 2017-09-29 | End: 2017-09-29

## 2017-09-29 RX ORDER — METOCLOPRAMIDE HYDROCHLORIDE 5 MG/ML
10 INJECTION INTRAMUSCULAR; INTRAVENOUS EVERY 6 HOURS PRN
Status: DISCONTINUED | OUTPATIENT
Start: 2017-09-29 | End: 2017-09-29 | Stop reason: HOSPADM

## 2017-09-29 RX ORDER — LABETALOL HYDROCHLORIDE 5 MG/ML
10 INJECTION, SOLUTION INTRAVENOUS
Status: DISCONTINUED | OUTPATIENT
Start: 2017-09-29 | End: 2017-09-29 | Stop reason: HOSPADM

## 2017-09-29 RX ORDER — BUPIVACAINE HYDROCHLORIDE AND EPINEPHRINE 2.5; 5 MG/ML; UG/ML
INJECTION, SOLUTION EPIDURAL; INFILTRATION; INTRACAUDAL; PERINEURAL PRN
Status: DISCONTINUED | OUTPATIENT
Start: 2017-09-29 | End: 2017-09-29 | Stop reason: HOSPADM

## 2017-09-29 RX ORDER — METOCLOPRAMIDE 10 MG/1
10 TABLET ORAL EVERY 6 HOURS PRN
Status: DISCONTINUED | OUTPATIENT
Start: 2017-09-29 | End: 2017-09-29 | Stop reason: HOSPADM

## 2017-09-29 RX ORDER — HYDRALAZINE HYDROCHLORIDE 20 MG/ML
2.5-5 INJECTION INTRAMUSCULAR; INTRAVENOUS EVERY 10 MIN PRN
Status: DISCONTINUED | OUTPATIENT
Start: 2017-09-29 | End: 2017-09-29 | Stop reason: HOSPADM

## 2017-09-29 RX ADMIN — LIDOCAINE HYDROCHLORIDE 50 MG: 10 INJECTION, SOLUTION INFILTRATION; PERINEURAL at 10:16

## 2017-09-29 RX ADMIN — SODIUM CHLORIDE, POTASSIUM CHLORIDE, SODIUM LACTATE AND CALCIUM CHLORIDE: 600; 310; 30; 20 INJECTION, SOLUTION INTRAVENOUS at 10:04

## 2017-09-29 RX ADMIN — Medication 4 MG: at 11:14

## 2017-09-29 RX ADMIN — ROCURONIUM BROMIDE 10 MG: 10 INJECTION INTRAVENOUS at 10:50

## 2017-09-29 RX ADMIN — MIDAZOLAM HYDROCHLORIDE 2 MG: 1 INJECTION, SOLUTION INTRAMUSCULAR; INTRAVENOUS at 10:04

## 2017-09-29 RX ADMIN — FENTANYL CITRATE 150 MCG: 50 INJECTION, SOLUTION INTRAMUSCULAR; INTRAVENOUS at 10:16

## 2017-09-29 RX ADMIN — PROPOFOL 200 MG: 10 INJECTION, EMULSION INTRAVENOUS at 10:16

## 2017-09-29 RX ADMIN — FENTANYL CITRATE 100 MCG: 50 INJECTION, SOLUTION INTRAMUSCULAR; INTRAVENOUS at 10:29

## 2017-09-29 RX ADMIN — KETOROLAC TROMETHAMINE 30 MG: 30 INJECTION, SOLUTION INTRAMUSCULAR at 12:13

## 2017-09-29 RX ADMIN — GLYCOPYRROLATE 0.2 MG: 0.2 INJECTION, SOLUTION INTRAMUSCULAR; INTRAVENOUS at 10:16

## 2017-09-29 RX ADMIN — ROCURONIUM BROMIDE 50 MG: 10 INJECTION INTRAVENOUS at 10:16

## 2017-09-29 RX ADMIN — DEXAMETHASONE SODIUM PHOSPHATE 4 MG: 4 INJECTION, SOLUTION INTRA-ARTICULAR; INTRALESIONAL; INTRAMUSCULAR; INTRAVENOUS; SOFT TISSUE at 10:16

## 2017-09-29 RX ADMIN — HYDROMORPHONE HYDROCHLORIDE 1 MG: 1 INJECTION, SOLUTION INTRAMUSCULAR; INTRAVENOUS; SUBCUTANEOUS at 10:35

## 2017-09-29 RX ADMIN — ONDANSETRON 4 MG: 2 INJECTION INTRAMUSCULAR; INTRAVENOUS at 10:54

## 2017-09-29 RX ADMIN — GLYCOPYRROLATE 0.3 MG: 0.2 INJECTION, SOLUTION INTRAMUSCULAR; INTRAVENOUS at 11:14

## 2017-09-29 RX ADMIN — CEFAZOLIN SODIUM 2 G: 2 INJECTION, SOLUTION INTRAVENOUS at 10:04

## 2017-09-29 RX ADMIN — SODIUM CHLORIDE, POTASSIUM CHLORIDE, SODIUM LACTATE AND CALCIUM CHLORIDE: 600; 310; 30; 20 INJECTION, SOLUTION INTRAVENOUS at 11:06

## 2017-09-29 NOTE — ANESTHESIA PREPROCEDURE EVALUATION
Anesthesia Evaluation     . Pt has had prior anesthetic. Type: General           ROS/MED HX    ENT/Pulmonary:  - neg pulmonary ROS     Neurologic:  - neg neurologic ROS     Cardiovascular:  - neg cardiovascular ROS       METS/Exercise Tolerance:     Hematologic:  - neg hematologic  ROS       Musculoskeletal:  - neg musculoskeletal ROS       GI/Hepatic:     (+) cholecystitis/cholelithiasis,       Renal/Genitourinary:  - ROS Renal section negative       Endo:  - neg endo ROS       Psychiatric:  - neg psychiatric ROS       Infectious Disease:  - neg infectious disease ROS       Malignancy:      - no malignancy   Other:    (+) No chance of pregnancy C-spine cleared: N/A, no H/O Chronic Pain,no other significant disability   - neg other ROS                 Physical Exam  Normal systems: cardiovascular, pulmonary and dental    Airway   Mallampati: II  TM distance: >3 FB  Neck ROM: full    Dental     Cardiovascular       Pulmonary                     Anesthesia Plan      History & Physical Review  History and physical reviewed and following examination; no interval change.    ASA Status:  2 .    NPO Status:  > 8 hours    Plan for General with Intravenous induction. Maintenance will be Balanced.    PONV prophylaxis:  Ondansetron (or other 5HT-3) and Dexamethasone or Solumedrol       Postoperative Care  Postoperative pain management:  IV analgesics.      Consents  Anesthetic plan, risks, benefits and alternatives discussed with:  Patient.  Use of blood products discussed: Yes.   Use of blood products discussed with Patient.  Consented to blood products.  .                          .

## 2017-09-29 NOTE — IP AVS SNAPSHOT
MRN:[id number]                      After Visit Summary   9/29/2017    Edward Nessa    MRN: [id number]           Thank you!     Thank you for choosing [facility] for your care. Our goal is always to provide you with excellent care. Hearing back from our patients is one way we can continue to improve our services. Please take a few minutes to complete the written survey that you may receive in the mail after you visit. If you would like to speak to someone directly about your visit please contact Patient Relations at [phone]. Thank you!          Patient Information     Date Of Birth          1970        About your hospital stay     You were admitted on:  September 29, 2017 You last received care in the:  [facility] PreOP/PostOP    You were discharged on:  September 29, 2017       Who to Call     For medical emergencies, please call [phone].  For non-urgent questions about your medical care, please call your primary care provider or clinic, [phone]  For questions related to your surgery, please call your surgery clinic        Attending Provider     Provider Specialty    Demond Zhou MD General Surgery       Primary Care Provider Office Phone # Fax #    Darren Henderson [phone]      Further instructions from your care team       GENERAL ANESTHESIA OR SEDATION ADULT DISCHARGE INSTRUCTIONS   SPECIAL PRECAUTIONS FOR 24 HOURS AFTER SURGERY    IT IS NOT UNUSUAL TO FEEL LIGHT-HEADED OR FAINT, UP TO 24 HOURS AFTER SURGERY OR WHILE TAKING PAIN MEDICATION.  IF YOU HAVE THESE SYMPTOMS; SIT FOR A FEW MINUTES BEFORE STANDING AND HAVE SOMEONE ASSIST YOU WHEN YOU GET UP TO WALK OR USE THE BATHROOM.    YOU SHOULD REST AND RELAX FOR THE NEXT 24 HOURS AND YOU MUST MAKE ARRANGEMENTS TO HAVE SOMEONE STAY WITH YOU FOR AT LEAST 24 HOURS AFTER YOUR DISCHARGE.  AVOID HAZARDOUS AND STRENUOUS ACTIVITIES.  DO NOT MAKE IMPORTANT DECISIONS FOR 24 HOURS.    DO NOT DRIVE  ANY VEHICLE OR OPERATE MECHANICAL EQUIPMENT FOR 24 HOURS FOLLOWING THE END OF YOUR SURGERY.  EVEN THOUGH YOU MAY FEEL NORMAL, YOUR REACTIONS MAY BE AFFECTED BY THE MEDICATION YOU HAVE RECEIVED.    DO NOT DRINK ALCOHOLIC BEVERAGES FOR 24 HOURS FOLLOWING YOUR SURGERY.    DRINK CLEAR LIQUIDS (APPLE JUICE, GINGER ALE, 7-UP, BROTH, ETC.).  PROGRESS TO YOUR REGULAR DIET AS YOU FEEL ABLE.    YOU MAY HAVE A DRY MOUTH, A SORE THROAT, MUSCLES ACHES OR TROUBLE SLEEPING.  THESE SHOULD GO AWAY AFTER 24 HOURS.    CALL YOUR DOCTOR FOR ANY OF THE FOLLOWING:  SIGNS OF INFECTION (FEVER, GROWING TENDERNESS AT THE SURGERY SITE, A LARGE AMOUNT OF DRAINAGE OR BLEEDING, SEVERE PAIN, FOUL-SMELLING DRAINAGE, REDNESS OR SWELLING.    IT HAS BEEN OVER 8 TO 10 HOURS SINCE SURGERY AND YOU ARE STILL NOT ABLE TO URINATE (PASS WATER).     HOME CARE FOLLOWING LAPAROSCOPIC CHOLECYSTECTOMY  Mynor Deutsch, GELY Moore, ZIYAD Davila, GELY Ferguson. RAYRAY Walton      INCISIONAL CARE:  Replace the bandage over your incisions until all drainage stops, or if more comfortable to have in place.  If present, leave the steri-strips (white paper tapes) in place until they fall off.  If Dermabond (a type of skin glue) is present, leave in place until it wears/flakes off.     BATHING:  Avoid baths for 1 week after surgery.  Showers are okay.  You may wash your hair at any time.  Gently pat your incisions dry after bathing.    ACTIVITY:  Light Activity -- you may immediately be up and about as tolerated.  Driving -- you may drive when comfortable and off narcotic pain medications.  Light Work -- resume when comfortable off pain medications.  (If you can drive, you probably can work.)  Strenuous Work/Activity -- limit lifting to 20 pounds for 1 week.  Progressively increase with time.  Active Sports (running, biking, etc.) -- cautiously resume after 2 weeks.    DISCOMFORT:  Use pain medications as prescribed by your surgeon.  Take the pain  medication with some food, when possible, to minimize side effects.  Intermittent use of ice packs at the incision sites may help during the first 48 hours.  Expect gradual improvement.  You may experience shoulder pain, which is due to the air placed within your abdomen during the procedure.  This is temporary and usually passes within 2 days.    DIET:  Drink plenty of fluids.  While taking pain medications, increase dietary fiber or add a fiber supplementation like Metamucil or Citrucel to help prevent constipation - a possible side effect of pain medications.  If taking Metamucil or Citrucel, take with plenty of fluids as instructed.  It is not uncommon to experience some bowel changes (loose stools or constipation) after surgery.  Your body has to adapt to you no longer having a gall bladder.  To help minimize this side effect, avoid fatty foods for the first week after surgery.  You may then slowly increase the amount of fatty foods in your diet.      NAUSEA:  If nauseated from the anesthetic/pain meds; rest in bed, get up cautiously with assistance, and drink clear liquids (juice, tea, broth).    RETURN APPOINTMENT:  Schedule a follow-up visit 1-3 weeks post-op (you may do this any time after surgery is scheduled).  Office Phone:  [phone]              CONTACT US IF THE FOLLOWING DEVELOPS:  1.  A fever that is above 101    2.  If there is a large amount of drainage, bleeding, or swelling.  3.  Severe pain that is not relieved by your prescription.  4.  Drainage that is thick, cloudy, yellow, green or white.  5.  Any other questions not answered by  Frequently Asked Questions  sheet.     You received Toradol, an IV form of ibuprofen (Motrin) at 12:00pm.  Do not take any ibuprofen products until 6:00pm.    Pending Results     Date and Time Order Name Status Description    9/29/2017 1059 Surgical pathology exam In process     9/29/2017 0924 XR Surgery JEVON L/T 5 Min Fluoro w Stills Preliminary            "  Admission Information     Date & Time Provider Department Dept. Phone    9/29/2017 Demond Zhou MD [facility] PreOP/PostOP [phone]      Your Vitals Were     Blood Pressure Temperature Respirations Height Weight Pulse Oximetry    134/87 97.7  F (36.5  C) (Temporal) 12 1.778 m (5' 10\") 90.2 kg (198 lb 12.8 oz) 94%    BMI (Body Mass Index)                   28.52 kg/m2           [facility] Information     [facility] gives you secure access to your electronic health record. If you see a primary care provider, you can also send messages to your care team and make appointments. If you have questions, please call your primary care clinic.  If you do not have a primary care provider, please call [phone] and they will assist you.        Care EveryWhere ID     This is your Care EveryWhere ID. This could be used by other organizations to access your [facility] medical records  [id number]        Equal Access to Services     OMID LOPEZ AH: Hadii barbara Leigh, waaxda lunavin, qaybta kaalmada adelidia, natalya jaquez . So [facility] [phone].    ATENCIÓN: Si habla pacoañol, tiene a bell disposición servicios gratuitos de asistencia lingüística. Llame al [phone].    We comply with applicable federal civil rights laws and [place] laws. We do not discriminate on the basis of race, color, national origin, age, disability, sex, sexual orientation, or gender identity.               Review of your medicines      START taking        Dose / Directions    oxyCODONE 5 MG IR tablet   Commonly known as:  ROXICODONE   Used for:  Choledocholithiasis        Dose:  5-10 mg   Take 1-2 tablets (5-10 mg) by mouth every 3 hours as needed for pain or other (Moderate to Severe)   Quantity:  30 tablet   Refills:  0         CONTINUE these medicines which have NOT CHANGED        Dose / Directions    cetirizine 10 MG tablet   Commonly known as:  zyrTEC        Dose:  10 mg   Take 10 mg by mouth daily as " needed for allergies   Refills:  0       ibuprofen 200 MG tablet   Commonly known as:  ADVIL/MOTRIN        Dose:  400 mg   Take 400 mg by mouth 3 times daily as needed for mild pain   Refills:  0       RHINOCORT ALLERGY 32 MCG/ACT spray   Generic drug:  budesonide        Dose:  1 spray   Spray 1 spray into both nostrils daily as needed for rhinitis or allergies   Refills:  0            Where to get your medicines      Some of these will need a paper prescription and others can be bought over the counter. Ask your nurse if you have questions.     Bring a paper prescription for each of these medications     oxyCODONE 5 MG IR tablet                Protect others around you: Learn how to safely use, store and throw away your medicines at www.disposemymeds.org.             Medication List: This is a list of all your medications and when to take them. Check marks below indicate your daily home schedule. Keep this list as a reference.      Medications           Morning Afternoon Evening Bedtime As Needed    cetirizine 10 MG tablet   Commonly known as:  zyrTEC   Take 10 mg by mouth daily as needed for allergies                                ibuprofen 200 MG tablet   Commonly known as:  ADVIL/MOTRIN   Take 400 mg by mouth 3 times daily as needed for mild pain                                oxyCODONE 5 MG IR tablet   Commonly known as:  ROXICODONE   Take 1-2 tablets (5-10 mg) by mouth every 3 hours as needed for pain or other (Moderate to Severe)                                RHINOCORT ALLERGY 32 MCG/ACT spray   Spray 1 spray into both nostrils daily as needed for rhinitis or allergies   Generic drug:  budesonide

## 2017-09-29 NOTE — OP NOTE
General Surgery Operative Note    PREOPERATIVE DIAGNOSIS:  Choledocholithasis, cholecystitis    POSTOPERATIVE DIAGNOSIS:  Same advanced chronic cholecysititis    PROCEDURE:  Laparoscopic Cholecystectomy  With fluoroscopic cholangiogram    ANESTHESIA:  General.    PREOPERATIVE MEDICATIONS:  Ancef IV.    SURGEON:  Demond Zhou MD    ASSISTANT:  Sosa Mancia PA-C  The physicians assistant was medically necessary for their expertise in camera management, suctioning, suturing, and retraction.      ESTIMATED BLOOD LOSS:  15cc's    INDICATIONS:  Edward Szymanski is a 47 year old male who has been experiencing episodes of epigastric and RLQ abdominal pain.  He underwent ERCP for choledocholithiasis.  He now presents for laparoscopic cholecystectomy after having risks and benefits reviewed in detail.    PROCEDURE:  The patient was brought to the operating room, placed supine on the table and after induction of anesthetic, the abdomen was prepped and draped in sterile manner.  Pause was performed.  An incision was made above the umbilicus and extended down to the midline fascia which was then opened.  A Ezio trocar was inserted.  Gas was insufflated.  The laparoscope was advanced.  There was no evidence or underlying bowel or tissue injury.  Secondary trocars were placed under laparoscopic guidance.  The gallbladder was grasped and retracted cephalad.  The infundibulum was grasped and retracted laterally.  The region of the cystic duct was stripped of its peritoneal and fatty coverings and followed to its confluence with the common hepatic and common bile ducts.  Once this confluence was identified, the region behind the infundibulum was also dissected, finding no aberrant ducts but identifying the cystic artery. A fluoroscopic cholangiogram was performed with Tavera clamp and catheter. This showed normal anatomy and no stones. Dye drained into a duodenal diverticulum. Films were reviewed by radiology.  The gallbladder  was then clipped at the infundibulum and 2 clips were placed on the cystic duct a generous distance from its confluence with the common hepatic and common bile ducts.  The cystic duct was then cut from the gallbladder.  Similarly, the cystic artery was triply clipped and divided.  The gallbladder was then removed from its hepatic fossa with electrocautery.  Once the gallbladder was removed from its fossa, it was placed in an Endocatch pouch and removed from the abdomen through the supraumbilical incision without spillage of bile or stones.  Marcaine was placed at each of the trocar sites and they were sequentially removed and viewed from within to be sure no bleeding was present and none was seen.  The final trocar was then removed after venting as much gas as possible from the abdomen and the fascia was specifically closed with 0 Vicryl sutures.  Subcuticular skin closure was performed followed by placement of Steri-Strips and dressings and she was returned to the recovery room in excellent condition with all sponge and needle counts correct, having tolerated the procedure well.    INTRAOPERATIVE FINDINGS:  Heavily scarred and thickened GB wall, nl cholangiogram, nl liver, anterior stomach, appendix. No inguinal hernia on internal exam.     Specimens:   ID Type Source Tests Collected by Time Destination   A : Gallbladder and contents Tissue Gallbladder and Contents SURGICAL PATHOLOGY EXAM Demond Zhou MD 9/29/2017 10:58 AM        Demond Zhou MD

## 2017-09-29 NOTE — DISCHARGE INSTRUCTIONS
GENERAL ANESTHESIA OR SEDATION ADULT DISCHARGE INSTRUCTIONS   SPECIAL PRECAUTIONS FOR 24 HOURS AFTER SURGERY    IT IS NOT UNUSUAL TO FEEL LIGHT-HEADED OR FAINT, UP TO 24 HOURS AFTER SURGERY OR WHILE TAKING PAIN MEDICATION.  IF YOU HAVE THESE SYMPTOMS; SIT FOR A FEW MINUTES BEFORE STANDING AND HAVE SOMEONE ASSIST YOU WHEN YOU GET UP TO WALK OR USE THE BATHROOM.    YOU SHOULD REST AND RELAX FOR THE NEXT 24 HOURS AND YOU MUST MAKE ARRANGEMENTS TO HAVE SOMEONE STAY WITH YOU FOR AT LEAST 24 HOURS AFTER YOUR DISCHARGE.  AVOID HAZARDOUS AND STRENUOUS ACTIVITIES.  DO NOT MAKE IMPORTANT DECISIONS FOR 24 HOURS.    DO NOT DRIVE ANY VEHICLE OR OPERATE MECHANICAL EQUIPMENT FOR 24 HOURS FOLLOWING THE END OF YOUR SURGERY.  EVEN THOUGH YOU MAY FEEL NORMAL, YOUR REACTIONS MAY BE AFFECTED BY THE MEDICATION YOU HAVE RECEIVED.    DO NOT DRINK ALCOHOLIC BEVERAGES FOR 24 HOURS FOLLOWING YOUR SURGERY.    DRINK CLEAR LIQUIDS (APPLE JUICE, GINGER ALE, 7-UP, BROTH, ETC.).  PROGRESS TO YOUR REGULAR DIET AS YOU FEEL ABLE.    YOU MAY HAVE A DRY MOUTH, A SORE THROAT, MUSCLES ACHES OR TROUBLE SLEEPING.  THESE SHOULD GO AWAY AFTER 24 HOURS.    CALL YOUR DOCTOR FOR ANY OF THE FOLLOWING:  SIGNS OF INFECTION (FEVER, GROWING TENDERNESS AT THE SURGERY SITE, A LARGE AMOUNT OF DRAINAGE OR BLEEDING, SEVERE PAIN, FOUL-SMELLING DRAINAGE, REDNESS OR SWELLING.    IT HAS BEEN OVER 8 TO 10 HOURS SINCE SURGERY AND YOU ARE STILL NOT ABLE TO URINATE (PASS WATER).     HOME CARE FOLLOWING LAPAROSCOPIC CHOLECYSTECTOMY  GELY Sauceda, ZIYAD Davila, GELY Ferguson. RAYRAY Walton      INCISIONAL CARE:  Replace the bandage over your incisions until all drainage stops, or if more comfortable to have in place.  If present, leave the steri-strips (white paper tapes) in place until they fall off.  If Dermabond (a type of skin glue) is present, leave in place until it wears/flakes off.     BATHING:  Avoid baths for 1 week after surgery.   Showers are okay.  You may wash your hair at any time.  Gently pat your incisions dry after bathing.    ACTIVITY:  Light Activity -- you may immediately be up and about as tolerated.  Driving -- you may drive when comfortable and off narcotic pain medications.  Light Work -- resume when comfortable off pain medications.  (If you can drive, you probably can work.)  Strenuous Work/Activity -- limit lifting to 20 pounds for 1 week.  Progressively increase with time.  Active Sports (running, biking, etc.) -- cautiously resume after 2 weeks.    DISCOMFORT:  Use pain medications as prescribed by your surgeon.  Take the pain medication with some food, when possible, to minimize side effects.  Intermittent use of ice packs at the incision sites may help during the first 48 hours.  Expect gradual improvement.  You may experience shoulder pain, which is due to the air placed within your abdomen during the procedure.  This is temporary and usually passes within 2 days.    DIET:  Drink plenty of fluids.  While taking pain medications, increase dietary fiber or add a fiber supplementation like Metamucil or Citrucel to help prevent constipation - a possible side effect of pain medications.  If taking Metamucil or Citrucel, take with plenty of fluids as instructed.  It is not uncommon to experience some bowel changes (loose stools or constipation) after surgery.  Your body has to adapt to you no longer having a gall bladder.  To help minimize this side effect, avoid fatty foods for the first week after surgery.  You may then slowly increase the amount of fatty foods in your diet.      NAUSEA:  If nauseated from the anesthetic/pain meds; rest in bed, get up cautiously with assistance, and drink clear liquids (juice, tea, broth).    RETURN APPOINTMENT:  Schedule a follow-up visit 1-3 weeks post-op (you may do this any time after surgery is scheduled).  Office Phone:  [phone]              CONTACT US IF THE FOLLOWING  DEVELOPS:  1.  A fever that is above 101    2.  If there is a large amount of drainage, bleeding, or swelling.  3.  Severe pain that is not relieved by your prescription.  4.  Drainage that is thick, cloudy, yellow, green or white.  5.  Any other questions not answered by  Frequently Asked Questions  sheet.     You received Toradol, an IV form of ibuprofen (Motrin) at 12:00pm.  Do not take any ibuprofen products until 6:00pm.

## 2017-09-29 NOTE — IP AVS SNAPSHOT
[facility] PreOP/PostOP    [address]    [facility] [address]    Phone:  [phone]    Fax:  [phone]                                       After Visit Summary   9/29/2017    Edward Nessa    MRN: [id number]           After Visit Summary Signature Page     I have received my discharge instructions, and my questions have been answered. I have discussed any challenges I see with this plan with the nurse or doctor.    ..........................................................................................................................................  Patient/Patient Representative Signature      ..........................................................................................................................................  Patient Representative Print Name and Relationship to Patient    ..................................................               ................................................  Date                                            Time    ..........................................................................................................................................  Reviewed by Signature/Title    ...................................................              ..............................................  Date                                                            Time

## 2017-09-29 NOTE — ANESTHESIA POSTPROCEDURE EVALUATION
Patient: Edward Nessa    Procedure(s):  LAPAROSCOPIC CHOLECYSTECTOMY WITH CHOLANGIOGRAMS  - Wound Class: II-Clean Contaminated    Diagnosis:choledocolithasis  Diagnosis Additional Information: Laparoscopic Cholecystectomy  With fluoroscopic cholangiogram    Anesthesia Type:  General    Note:  Anesthesia Post Evaluation    Patient location during evaluation: PACU  Patient participation: Able to fully participate in evaluation  Level of consciousness: awake and alert  Pain management: adequate  Airway patency: patent  Cardiovascular status: acceptable  Respiratory status: acceptable  Hydration status: acceptable  PONV: controlled     Anesthetic complications: None          Last vitals:  Vitals:    09/29/17 1215 09/29/17 1230 09/29/17 1247   BP: 146/90 138/89 134/87   Resp: 12 12 12   Temp:   97.7  F (36.5  C)   SpO2: 96% 97% 94%         Electronically Signed By: Donnell Andujar MD  September 29, 2017  1:36 PM

## 2017-10-02 LAB — COPATH REPORT: NORMAL

## 2017-10-09 ENCOUNTER — OFFICE VISIT (OUTPATIENT)
Dept: SURGERY | Facility: CLINIC | Age: 47
End: 2017-10-09
Payer: COMMERCIAL

## 2017-10-09 VITALS — WEIGHT: 198 LBS | BODY MASS INDEX: 28.35 KG/M2 | HEIGHT: 70 IN

## 2017-10-09 DIAGNOSIS — Z09 SURGICAL FOLLOWUP VISIT: Primary | ICD-10-CM

## 2017-10-09 PROCEDURE — 99024 POSTOP FOLLOW-UP VISIT: CPT | Performed by: PHYSICIAN ASSISTANT

## 2017-10-09 NOTE — PROGRESS NOTES
[facility] Note     Subjective:  Edwardabdias Szymanski is here for his first postoperative visit. He underwent a laparoscopic cholecystectomy by Dr. Zhou on 9/29/17. Today he  tells me he has been feeling well since surgery. He currently does not require narcotic pain medications, he is eating a normal diet and his bowels are constipated. He used and enema and suppository to help with his constipation. He is now taking colace BID and he is having daily BMs but still somewhat firm. He is back to work and has no other issues.    Objective:  Abd - soft, non-tender, non-distended  Inc - c/d/i, no erythema, +healing ridge, no masses    Assessment:  Constipation - improving  S/p laparoscopic cholecystectomy. The pathology confirms chronic cholecystitis and cholelithiasis.    Plan:  Recommend Miralax daily until bowels become more regular and soft  Continue low-fat diet for two more weeks  No activity restrictions  Call/RTC PRN      Henok Crowe PA-C  10/9/2017      Please route or send letter to:  Primary Care Provider (PCP)

## 2017-10-09 NOTE — MR AVS SNAPSHOT
"              After Visit Summary   10/9/2017    Edwardabdias Szymanski    MRN: [id number]           Patient Information     Date Of Birth          1970        Visit Information        Provider Department      10/9/2017 11:00 AM Henok Crowe PA-C [facility] General Surgery      Today's Diagnoses     Surgical followup visit    -  1       Follow-ups after your visit        Follow-up notes from your care team     Return if symptoms worsen or fail to improve.      Who to contact     If you have questions or need follow up information about today's clinic visit or your schedule please contact [facility] directly at [phone].  Normal or non-critical lab and imaging results will be communicated to you by MyChart, letter or phone within 4 business days after the clinic has received the results. If you do not hear from us within 7 days, please contact the clinic through [employer] or phone. If you have a critical or abnormal lab result, we will notify you by phone as soon as possible.  Submit refill requests through [employer] or call your pharmacy and they will forward the refill request to us. Please allow 3 business days for your refill to be completed.          Additional Information About Your Visit        MyChart Information     [employer] gives you secure access to your electronic health record. If you see a primary care provider, you can also send messages to your care team and make appointments. If you have questions, please call your primary care clinic.  If you do not have a primary care provider, please call [phone] and they will assist you.        Care EveryWhere ID     This is your Care EveryWhere ID. This could be used by other organizations to access your [facility] medical records  [id number]        Your Vitals Were     Height BMI (Body Mass Index)                1.778 m (5' 10\") 28.41 kg/m2           Blood Pressure from Last 3 " Encounters:   09/29/17 126/88   09/20/17 124/90   09/16/17 (!) 137/94    Weight from Last 3 Encounters:   10/09/17 89.8 kg (198 lb)   09/29/17 90.2 kg (198 lb 12.8 oz)   09/20/17 90.9 kg (200 lb 6.4 oz)              Today, you had the following     No orders found for display       Primary Care Provider Office Phone # Fax #    Darren Henderosn [phone] [address] [address]        Equal Access to Services     [facility] : Hadii barbara norwood hadasho Sokacy, waaxda luqadaha, qaybta kaalmada adepolloyadeja, natalya jaquez . So [facility] [phone].    ATENCIÓN: Si habla español, tiene a bell disposición servicios gratuitos de asistencia lingüística. [facility] [phone].    We comply with applicable federal civil rights laws and [place] laws. We do not discriminate on the basis of race, color, national origin, age, disability, sex, sexual orientation, or gender identity.            Thank you!     Thank you for choosing [facility]  for your care. Our goal is always to provide you with excellent care. Hearing back from our patients is one way we can continue to improve our services. Please take a few minutes to complete the written survey that you may receive in the mail after your visit with us. Thank you!             Your Updated Medication List - Protect others around you: Learn how to safely use, store and throw away your medicines at www.disposemymeds.org.          This list is accurate as of: 10/9/17 11:12 AM.  Always use your most recent med list.                   Brand Name Dispense Instructions for use Diagnosis    cetirizine 10 MG tablet    zyrTEC     Take 10 mg by mouth daily as needed for allergies        ibuprofen 200 MG tablet    ADVIL/MOTRIN     Take 400 mg by mouth 3 times daily as needed for mild pain        oxyCODONE 5 MG IR tablet    ROXICODONE    30 tablet    Take 1-2 tablets (5-10 mg) by mouth every 3 hours as  needed for pain or other (Moderate to Severe)    Choledocholithiasis       RHINOCORT ALLERGY 32 MCG/ACT spray   Generic drug:  budesonide      Spray 1 spray into both nostrils daily as needed for rhinitis or allergies

## 2020-02-23 ENCOUNTER — HEALTH MAINTENANCE LETTER (OUTPATIENT)
Age: 50
End: 2020-02-23

## 2021-03-25 ENCOUNTER — IMMUNIZATION (OUTPATIENT)
Dept: NURSING | Facility: CLINIC | Age: 51
End: 2021-03-25
Payer: COMMERCIAL

## 2021-03-25 PROCEDURE — 0001A PR COVID VAC PFIZER DIL RECON 30 MCG/0.3 ML IM: CPT

## 2021-03-25 PROCEDURE — 91300 PR COVID VAC PFIZER DIL RECON 30 MCG/0.3 ML IM: CPT

## 2021-04-15 ENCOUNTER — IMMUNIZATION (OUTPATIENT)
Dept: NURSING | Facility: CLINIC | Age: 51
End: 2021-04-15
Attending: INTERNAL MEDICINE
Payer: COMMERCIAL

## 2021-04-15 PROCEDURE — 91300 PR COVID VAC PFIZER DIL RECON 30 MCG/0.3 ML IM: CPT

## 2021-04-15 PROCEDURE — 0002A PR COVID VAC PFIZER DIL RECON 30 MCG/0.3 ML IM: CPT

## 2021-07-28 ENCOUNTER — OFFICE VISIT (OUTPATIENT)
Dept: URGENT CARE | Facility: URGENT CARE | Age: 51
End: 2021-07-28
Payer: COMMERCIAL

## 2021-07-28 VITALS
RESPIRATION RATE: 20 BRPM | SYSTOLIC BLOOD PRESSURE: 132 MMHG | HEART RATE: 83 BPM | OXYGEN SATURATION: 98 % | DIASTOLIC BLOOD PRESSURE: 80 MMHG | TEMPERATURE: 97.4 F | BODY MASS INDEX: 30.42 KG/M2 | WEIGHT: 212 LBS

## 2021-07-28 DIAGNOSIS — J01.01 ACUTE RECURRENT MAXILLARY SINUSITIS: Primary | ICD-10-CM

## 2021-07-28 PROCEDURE — 99203 OFFICE O/P NEW LOW 30 MIN: CPT | Performed by: PHYSICIAN ASSISTANT

## 2021-07-28 RX ORDER — AMOXICILLIN 875 MG
875 TABLET ORAL 2 TIMES DAILY
Qty: 20 TABLET | Refills: 0 | Status: SHIPPED | OUTPATIENT
Start: 2021-07-28 | End: 2021-08-07

## 2021-07-28 NOTE — PROGRESS NOTES
Assessment & Plan     Acute recurrent maxillary sinusitis  amox  Continue rhinocort  Saline and steam  - amoxicillin (AMOXIL) 875 MG tablet; Take 1 tablet (875 mg) by mouth 2 times daily for 10 days               No follow-ups on file.    Lew Joseph PA-C  [facility] GORDO Leon is a 51 year old who presents for the following health issues     HPI     Sinus infection  9 days    Review of Systems   Constitutional, HEENT, cardiovascular, pulmonary, gi and gu systems are negative, except as otherwise noted.      Objective    /80   Pulse 83   Temp 97.4  F (36.3  C)   Resp 20   Wt 96.2 kg (212 lb)   SpO2 98%   BMI 30.42 kg/m    Body mass index is 30.42 kg/m .  Physical Exam   GENERAL: healthy, alert and no distress  EYES: Eyes grossly normal to inspection, PERRL and conjunctivae and sclerae normal  HENT: normal cephalic/atraumatic, ear canals and TM's normal, nose and mouth without ulcers or lesions, oropharynx clear, oral mucous membranes moist and sinuses: not tender, maxillary, frontal swelling on   NECK: no adenopathy, no asymmetry, masses, or scars and thyroid normal to palpation  RESP: lungs clear to auscultation - no rales, rhonchi or wheezes  CV: regular rate and rhythm, normal S1 S2, no S3 or S4, no murmur, click or rub, no peripheral edema and peripheral pulses strong  MS: no gross musculoskeletal defects noted, no edema  SKIN: no suspicious lesions or rashes  NEURO: Normal strength and tone, mentation intact and speech normal  PSYCH: mentation appears normal, affect normal/bright

## 2021-10-26 ENCOUNTER — OFFICE VISIT (OUTPATIENT)
Dept: ORTHOPEDICS | Facility: CLINIC | Age: 51
End: 2021-10-26
Payer: COMMERCIAL

## 2021-10-26 ENCOUNTER — ANCILLARY PROCEDURE (OUTPATIENT)
Dept: GENERAL RADIOLOGY | Facility: CLINIC | Age: 51
End: 2021-10-26
Attending: STUDENT IN AN ORGANIZED HEALTH CARE EDUCATION/TRAINING PROGRAM
Payer: COMMERCIAL

## 2021-10-26 VITALS
HEIGHT: 71 IN | DIASTOLIC BLOOD PRESSURE: 82 MMHG | BODY MASS INDEX: 29.26 KG/M2 | SYSTOLIC BLOOD PRESSURE: 130 MMHG | WEIGHT: 209 LBS

## 2021-10-26 DIAGNOSIS — M25.561 CHRONIC PAIN OF RIGHT KNEE: Primary | ICD-10-CM

## 2021-10-26 DIAGNOSIS — M25.561 RIGHT KNEE PAIN: ICD-10-CM

## 2021-10-26 DIAGNOSIS — G89.29 CHRONIC PAIN OF RIGHT KNEE: Primary | ICD-10-CM

## 2021-10-26 PROCEDURE — 99203 OFFICE O/P NEW LOW 30 MIN: CPT | Performed by: STUDENT IN AN ORGANIZED HEALTH CARE EDUCATION/TRAINING PROGRAM

## 2021-10-26 PROCEDURE — 73562 X-RAY EXAM OF KNEE 3: CPT | Performed by: RADIOLOGY

## 2021-10-26 ASSESSMENT — KOOS JR
GOING UP OR DOWN STAIRS: MODERATE
BENDING TO THE FLOOR TO PICK UP OBJECT: MODERATE
STANDING UPRIGHT: MILD
HOW SEVERE IS YOUR KNEE STIFFNESS AFTER FIRST WAKING IN MORNING: MODERATE
RISING FROM SITTING: MODERATE
STRAIGHTENING KNEE FULLY: MILD
TWISING OR PIVOTING ON KNEE: MILD
KOOS JR SCORING: 59.38

## 2021-10-26 ASSESSMENT — MIFFLIN-ST. JEOR: SCORE: 1825.15

## 2021-10-26 NOTE — LETTER
10/26/2021         RE: Edward DARIO MorleyNessa  [address]  [facility] [address]        Dear Colleague,    Thank you for referring your patient, Edward Szymanski, to the [facility] [place]. Please see a copy of my visit note below.        [facility]  Orthopaedic Surgery Consultation by Akil Young M.D.    Edward DARIO Szymanski MRN# [id number]   Age: 51 year old YOB: 1970     Requesting physician: Referred Self  Darren Henderson     Background history:  DX:  1. Choledocholithiasis  2. Pancreatitis  3. Hyperlipidemia    TREATMENTS:  1. 9/29/2017, laparoscopic cholecystectomy, Dr. Zhou           History of Present Illness:   51 year old male who presents to our clinic because of chronic right knee pain.  This pain has been present for approximately 6-7 months without any antecedent trauma.  Patient noticed pain of his anterior knee after kneeling down for a while.  No twisting injuries or falls.  Since then patient is experiencing intermittent episodes of a dull ache throughout the right knee.  The pain appears to be getting worse when pivoting on his knee with his foot planted.  The pain does not radiate.  The knee swells occasionally.  No mechanical symptoms or instability.  Patient describes the presence of occasional hip and back pain.  No motor or sensory deficits.  Patient endorses the presence of occasional night pain and initiation stiffness.  It responded well to weight reduction and activity modification.  Patient has not taken over-the-counter analgesics.  He has not seen a physical therapist nor had any intra-articular injections.    Social:   Occupation: not working  Living situation: spouse  Hobbies / Sports: biking    Smoking: No  Alcohol: Yes social  Illicit drug use: No         Physical Exam:     EXAMINATION pertinent findings:   PSYCH: Pleasant, healthy-appearing, alert, oriented x3, cooperative. Normal mood and affect.  VITAL SIGNS: There were no  vitals taken for this visit.  Reviewed nursing intake notes.   There is no height or weight on file to calculate BMI.  RESP: non labored breathing   ABD: benign, soft, non-tender, no acute peritoneal findings  SKIN: grossly normal   LYMPHATIC: grossly normal, no adenopathy, no extremity edema  NEURO: grossly normal , no motor deficits  VASCULAR: satisfactory perfusion of all extremities   MUSCULOSKELETAL:   Alignment: Neutral  Gait: Normal    The right hip exhibits a full range of motion.  No pain upon rotations.  Lasegue's test is negative.  No tenderness to palpation of the greater trochanteric region.    R knee: -0-0  .  Deep flexion is slightly sensitive behind the kneecap.  Straight leg raise +. No redness, warmth or skin changes present. Effusion Minimal. Ligamentously stable in both ML and AP direction. Normal PF tracking without crepitus. Rabot is recognizably sensitive. Meniscal provocation tests are negative. No tenderness to palpation over the joint line.     Right LE:   Thigh and leg compartments soft and compressible   +Quad/TA/GSC/FHL/EHL   SILT DP/SP/Rohit/Saph/Tib nerve distributions   Palpable dorsalis pedis pulse              Data:   All laboratory data reviewed  All imaging studies reviewed by me personally.    XR knee right 10/26/2021:  My interpretation: Normal tibiofemoral joint.  Normal joint space height.  No significant marginal osteophytes, sclerosis or subchondral cyst.  Patello femoral joint appears to be well-preserved.         Assessment and Plan:   Assessment:  51-year-old male with chronic pain of right knee due to early osteoarthritic changes of the right knee most likely in patellofemoral joint and in setting of degenerative meniscal tear without mechanical symptoms.     Plan:  I extensively discussed my findings with the patient.  We discussed the nonsurgical treatment options for early arthritis of the knee which consist of weight reduction, activity modification,  over-the-counter analgesics, bracing, physical therapy, injection therapy.  In this particular case it would be my recommendation to start physical therapy for range of motion strengthening exercises.  He can obtain a neoprene sleeve for comfort.  He will use over-the-counter analgesics as needed.  He will continue with his weight reduction and activity modification.  We will follow-up with him on an as-needed basis.  Patient understands and agrees to treatment plan as set forth.    Thank you for your referral    Akil Young MD, PhD     Adult Reconstruction  [facility] Department of Orthopaedic Surgery  Pager [phone]      DATA for DOCUMENTATION:         Past Medical History:     Patient Active Problem List   Diagnosis     HYPERLIPIDEMIA LDL GOAL <130     Generalized anxiety disorder     Choledocholithiasis     Pancreatitis     Past Medical History:   Diagnosis Date     Allergic rhinitis      High cholesterol      Hyperlipidemia        Also see scanned health assessment forms.       Past Surgical History:     Past Surgical History:   Procedure Laterality Date     ENDOSCOPIC RETROGRADE CHOLANGIOPANCREATOGRAM N/A 9/12/2017    Procedure: ENDOSCOPIC RETROGRADE CHOLANGIOPANCREATOGRAM;  ENDOSCOPIC RETROGRADE CHOLANGIOPANCREATOGRAM with sphincterotomy and stone extraction;  Surgeon: Shiloh James MD;  Location: [facility] OR     LAPAROSCOPIC CHOLECYSTECTOMY WITH CHOLANGIOGRAMS N/A 9/29/2017    Procedure: LAPAROSCOPIC CHOLECYSTECTOMY WITH CHOLANGIOGRAMS;  LAPAROSCOPIC CHOLECYSTECTOMY WITH CHOLANGIOGRAMS ;  Surgeon: Demond Zhou MD;  Location: [facility] OR     NO HISTORY OF SURGERY              Social History:     Social History     Socioeconomic History     Marital status:      Spouse name: Not on file     Number of children: 1     Years of education: Not on file     Highest education level: Not on file   Occupational History     Employer: SELF   Tobacco Use     Smoking  status: Never Smoker     Smokeless tobacco: Never Used   Substance and Sexual Activity     Alcohol use: Yes     Comment: Socially     Drug use: No     Sexual activity: Yes     Partners: Female   Other Topics Concern     Parent/sibling w/ CABG, MI or angioplasty before 65F 55M? Not Asked   Social History Narrative     Not on file     Social Determinants of Health     Financial Resource Strain:      Difficulty of Paying Living Expenses:    Food Insecurity:      Worried About Running Out of Food in the Last Year:      Ran Out of Food in the Last Year:    Transportation Needs:      Lack of Transportation (Medical):      Lack of Transportation (Non-Medical):    Physical Activity:      Days of Exercise per Week:      Minutes of Exercise per Session:    Stress:      Feeling of Stress :    Social Connections:      Frequency of Communication with Friends and Family:      Frequency of Social Gatherings with Friends and Family:      Attends Lutheran Services:      Active Member of Clubs or Organizations:      Attends Club or Organization Meetings:      Marital Status:    Intimate Partner Violence:      Fear of Current or Ex-Partner:      Emotionally Abused:      Physically Abused:      Sexually Abused:             Family History:       Family History   Problem Relation Age of Onset     Neurologic Disorder Father         Muscular dystrophy     Family History Negative Mother      Family History Negative Sister      Family History Negative Sister             Medications:     Current Outpatient Medications   Medication Sig     budesonide (RHINOCORT ALLERGY) 32 MCG/ACT spray Spray 1 spray into both nostrils daily as needed for rhinitis or allergies     cetirizine (ZYRTEC) 10 MG tablet Take 10 mg by mouth daily as needed for allergies     ibuprofen (ADVIL/MOTRIN) 200 MG tablet Take 400 mg by mouth 3 times daily as needed for mild pain (Patient not taking: Reported on 7/28/2021)     oxyCODONE (ROXICODONE) 5 MG IR tablet Take 1-2  tablets (5-10 mg) by mouth every 3 hours as needed for pain or other (Moderate to Severe) (Patient not taking: Reported on 7/28/2021)     No current facility-administered medications for this visit.              Review of Systems:   A comprehensive 10 point review of systems (constitutional, ENT, cardiac, peripheral vascular, lymphatic, respiratory, GI, , Musculoskeletal, skin, Neurological) was performed and found to be negative except as described in this note.     See intake form completed by patient          Again, thank you for allowing me to participate in the care of your patient.        Sincerely,        Akil Young MD

## 2021-10-26 NOTE — PROGRESS NOTES
[facility]  Orthopaedic Surgery Consultation by Akil Young M.D.    Edward DARIO Szymanski MRN# [id number]   Age: 51 year old YOB: 1970     Requesting physician: Referred Self  Darren Henderson     Background history:  DX:  1. Choledocholithiasis  2. Pancreatitis  3. Hyperlipidemia    TREATMENTS:  1. 9/29/2017, laparoscopic cholecystectomy, Dr. Zhou           History of Present Illness:   51 year old male who presents to our clinic because of chronic right knee pain.  This pain has been present for approximately 6-7 months without any antecedent trauma.  Patient noticed pain of his anterior knee after kneeling down for a while.  No twisting injuries or falls.  Since then patient is experiencing intermittent episodes of a dull ache throughout the right knee.  The pain appears to be getting worse when pivoting on his knee with his foot planted.  The pain does not radiate.  The knee swells occasionally.  No mechanical symptoms or instability.  Patient describes the presence of occasional hip and back pain.  No motor or sensory deficits.  Patient endorses the presence of occasional night pain and initiation stiffness.  It responded well to weight reduction and activity modification.  Patient has not taken over-the-counter analgesics.  He has not seen a physical therapist nor had any intra-articular injections.    Social:   Occupation: not working  Living situation: spouse  Hobbies / Sports: biking    Smoking: No  Alcohol: Yes social  Illicit drug use: No         Physical Exam:     EXAMINATION pertinent findings:   PSYCH: Pleasant, healthy-appearing, alert, oriented x3, cooperative. Normal mood and affect.  VITAL SIGNS: There were no vitals taken for this visit.  Reviewed nursing intake notes.   There is no height or weight on file to calculate BMI.  RESP: non labored breathing   ABD: benign, soft, non-tender, no acute peritoneal findings  SKIN: grossly normal   LYMPHATIC: grossly normal, no  adenopathy, no extremity edema  NEURO: grossly normal , no motor deficits  VASCULAR: satisfactory perfusion of all extremities   MUSCULOSKELETAL:   Alignment: Neutral  Gait: Normal    The right hip exhibits a full range of motion.  No pain upon rotations.  Lasegue's test is negative.  No tenderness to palpation of the greater trochanteric region.    R knee: -0-0  .  Deep flexion is slightly sensitive behind the kneecap.  Straight leg raise +. No redness, warmth or skin changes present. Effusion Minimal. Ligamentously stable in both ML and AP direction. Normal PF tracking without crepitus. Rabot is recognizably sensitive. Meniscal provocation tests are negative. No tenderness to palpation over the joint line.     Right LE:   Thigh and leg compartments soft and compressible   +Quad/TA/GSC/FHL/EHL   SILT DP/SP/Rohit/Saph/Tib nerve distributions   Palpable dorsalis pedis pulse              Data:   All laboratory data reviewed  All imaging studies reviewed by me personally.    XR knee right 10/26/2021:  My interpretation: Normal tibiofemoral joint.  Normal joint space height.  No significant marginal osteophytes, sclerosis or subchondral cyst.  Patello femoral joint appears to be well-preserved.         Assessment and Plan:   Assessment:  51-year-old male with chronic pain of right knee due to early osteoarthritic changes of the right knee most likely in patellofemoral joint and in setting of degenerative meniscal tear without mechanical symptoms.     Plan:  I extensively discussed my findings with the patient.  We discussed the nonsurgical treatment options for early arthritis of the knee which consist of weight reduction, activity modification, over-the-counter analgesics, bracing, physical therapy, injection therapy.  In this particular case it would be my recommendation to start physical therapy for range of motion strengthening exercises.  He can obtain a neoprene sleeve for comfort.  He will use  over-the-counter analgesics as needed.  He will continue with his weight reduction and activity modification.  We will follow-up with him on an as-needed basis.  Patient understands and agrees to treatment plan as set forth.    Thank you for your referral    Akil Young MD, PhD     Adult Reconstruction  [facility] Department of Orthopaedic Surgery  Pager [phone]      DATA for DOCUMENTATION:         Past Medical History:     Patient Active Problem List   Diagnosis     HYPERLIPIDEMIA LDL GOAL <130     Generalized anxiety disorder     Choledocholithiasis     Pancreatitis     Past Medical History:   Diagnosis Date     Allergic rhinitis      High cholesterol      Hyperlipidemia        Also see scanned health assessment forms.       Past Surgical History:     Past Surgical History:   Procedure Laterality Date     ENDOSCOPIC RETROGRADE CHOLANGIOPANCREATOGRAM N/A 9/12/2017    Procedure: ENDOSCOPIC RETROGRADE CHOLANGIOPANCREATOGRAM;  ENDOSCOPIC RETROGRADE CHOLANGIOPANCREATOGRAM with sphincterotomy and stone extraction;  Surgeon: Shiloh James MD;  Location: [facility] OR     LAPAROSCOPIC CHOLECYSTECTOMY WITH CHOLANGIOGRAMS N/A 9/29/2017    Procedure: LAPAROSCOPIC CHOLECYSTECTOMY WITH CHOLANGIOGRAMS;  LAPAROSCOPIC CHOLECYSTECTOMY WITH CHOLANGIOGRAMS ;  Surgeon: Demond Zhou MD;  Location: [facility] OR     NO HISTORY OF SURGERY              Social History:     Social History     Socioeconomic History     Marital status:      Spouse name: Not on file     Number of children: 1     Years of education: Not on file     Highest education level: Not on file   Occupational History     Employer: SELF   Tobacco Use     Smoking status: Never Smoker     Smokeless tobacco: Never Used   Substance and Sexual Activity     Alcohol use: Yes     Comment: Socially     Drug use: No     Sexual activity: Yes     Partners: Female   Other Topics Concern     Parent/sibling w/ CABG, MI or angioplasty  before 65F 55M? Not Asked   Social History Narrative     Not on file     Social Determinants of Health     Financial Resource Strain:      Difficulty of Paying Living Expenses:    Food Insecurity:      Worried About Running Out of Food in the Last Year:      Ran Out of Food in the Last Year:    Transportation Needs:      Lack of Transportation (Medical):      Lack of Transportation (Non-Medical):    Physical Activity:      Days of Exercise per Week:      Minutes of Exercise per Session:    Stress:      Feeling of Stress :    Social Connections:      Frequency of Communication with Friends and Family:      Frequency of Social Gatherings with Friends and Family:      Attends Zoroastrianism Services:      Active Member of Clubs or Organizations:      Attends Club or Organization Meetings:      Marital Status:    Intimate Partner Violence:      Fear of Current or Ex-Partner:      Emotionally Abused:      Physically Abused:      Sexually Abused:             Family History:       Family History   Problem Relation Age of Onset     Neurologic Disorder Father         Muscular dystrophy     Family History Negative Mother      Family History Negative Sister      Family History Negative Sister             Medications:     Current Outpatient Medications   Medication Sig     budesonide (RHINOCORT ALLERGY) 32 MCG/ACT spray Spray 1 spray into both nostrils daily as needed for rhinitis or allergies     cetirizine (ZYRTEC) 10 MG tablet Take 10 mg by mouth daily as needed for allergies     ibuprofen (ADVIL/MOTRIN) 200 MG tablet Take 400 mg by mouth 3 times daily as needed for mild pain (Patient not taking: Reported on 7/28/2021)     oxyCODONE (ROXICODONE) 5 MG IR tablet Take 1-2 tablets (5-10 mg) by mouth every 3 hours as needed for pain or other (Moderate to Severe) (Patient not taking: Reported on 7/28/2021)     No current facility-administered medications for this visit.              Review of Systems:   A comprehensive 10 point review  of systems (constitutional, ENT, cardiac, peripheral vascular, lymphatic, respiratory, GI, , Musculoskeletal, skin, Neurological) was performed and found to be negative except as described in this note.     See intake form completed by patient

## 2021-10-26 NOTE — PATIENT INSTRUCTIONS
1. Right knee pain        Physical Therapy [facility].  You can call [phone] to schedule at your convenience.       Follow up with Dr. Young in 3 months as needed.    Call my office with any questions or concerns, [phone].

## 2021-11-02 ENCOUNTER — THERAPY VISIT (OUTPATIENT)
Dept: PHYSICAL THERAPY | Facility: CLINIC | Age: 51
End: 2021-11-02
Attending: STUDENT IN AN ORGANIZED HEALTH CARE EDUCATION/TRAINING PROGRAM
Payer: COMMERCIAL

## 2021-11-02 DIAGNOSIS — G89.29 CHRONIC PAIN OF RIGHT KNEE: ICD-10-CM

## 2021-11-02 DIAGNOSIS — M25.561 CHRONIC PAIN OF RIGHT KNEE: ICD-10-CM

## 2021-11-02 PROCEDURE — 97161 PT EVAL LOW COMPLEX 20 MIN: CPT | Mod: GP | Performed by: PHYSICAL THERAPIST

## 2021-11-02 PROCEDURE — 97110 THERAPEUTIC EXERCISES: CPT | Mod: GP | Performed by: PHYSICAL THERAPIST

## 2021-11-02 ASSESSMENT — ACTIVITIES OF DAILY LIVING (ADL)
KNEE_ACTIVITY_OF_DAILY_LIVING_SUM: 39
GO UP STAIRS: ACTIVITY IS FAIRLY DIFFICULT
STAND: ACTIVITY IS NOT DIFFICULT
RISE FROM A CHAIR: ACTIVITY IS SOMEWHAT DIFFICULT
LIMPING: THE SYMPTOM AFFECTS MY ACTIVITY MODERATELY
SWELLING: I HAVE THE SYMPTOM BUT IT DOES NOT AFFECT MY ACTIVITY
GIVING WAY, BUCKLING OR SHIFTING OF KNEE: THE SYMPTOM AFFECTS MY ACTIVITY MODERATELY
HOW_WOULD_YOU_RATE_THE_OVERALL_FUNCTION_OF_YOUR_KNEE_DURING_YOUR_USUAL_DAILY_ACTIVITIES?: ABNORMAL
HOW_WOULD_YOU_RATE_THE_CURRENT_FUNCTION_OF_YOUR_KNEE_DURING_YOUR_USUAL_DAILY_ACTIVITIES_ON_A_SCALE_FROM_0_TO_100_WITH_100_BEING_YOUR_LEVEL_OF_KNEE_FUNCTION_PRIOR_TO_YOUR_INJURY_AND_0_BEING_THE_INABILITY_TO_PERFORM_ANY_OF_YOUR_USUAL_DAILY_ACTIVITIES?: 90
STIFFNESS: THE SYMPTOM AFFECTS MY ACTIVITY MODERATELY
RAW_SCORE: 39
SQUAT: ACTIVITY IS VERY DIFFICULT
KNEE_ACTIVITY_OF_DAILY_LIVING_SCORE: 55.71
AS_A_RESULT_OF_YOUR_KNEE_INJURY,_HOW_WOULD_YOU_RATE_YOUR_CURRENT_LEVEL_OF_DAILY_ACTIVITY?: ABNORMAL
SIT WITH YOUR KNEE BENT: ACTIVITY IS MINIMALLY DIFFICULT
WEAKNESS: I DO NOT HAVE THE SYMPTOM
WALK: ACTIVITY IS MINIMALLY DIFFICULT
GO DOWN STAIRS: ACTIVITY IS FAIRLY DIFFICULT
PAIN: THE SYMPTOM AFFECTS MY ACTIVITY MODERATELY
KNEEL ON THE FRONT OF YOUR KNEE: ACTIVITY IS VERY DIFFICULT

## 2021-11-02 NOTE — PROGRESS NOTES
"Physical Therapy Initial Evaluation  Subjective:  The history is provided by the patient. No  was used.   Therapist Generated HPI Evaluation  Problem details: Pt presents with complaints of R knee pain. Pt reported on/off sx's over the past several years. Pt reported an incident this past March where he was squatting down to pet the dog and felt sharp, burning pain in the knee. Pt reported MD office visit on 10-26-21 related to current knee sx's. Pt reported he is able to function overall with minimal issues but was interested in specific rehab for the knee.         Type of problem:  Right knee.    This is a chronic condition.  Condition occurred with:  Insidious onset.    Patient reports pain:  Anterior, lateral, medial and other (supra and infrapatellar ).  and is intermittent.  Pain is worse during the day.  Since onset symptoms are unchanged.  Symptoms are exacerbated by descending stairs, ascending stairs, kneeling and bending/squatting  and relieved by heat.                              Objective:  Standing Alignment:              Knee:  Normal      Gait:    Gait Type:  Normal   Assistive Devices:  None                                                        Knee Evaluation:  ROM:    AROM      Extension: Left:    Right:  0; with discomfort  Flexion: Left:   Right: 125 degrees      Endfeel: firm end feel to passive knee extension on the R  Strength:     Extension:  Right: 5/5   Pain:  Flexion:  Right: 5/5   Pain:      Quad Set Right:  Poor    Pain: +++      Palpation:  Palpation of knee: Palpable nodule noted posterior R knee; non-tender to palpation.      Right knee tenderness not present at:  Medial Joint Line; Lateral Joint Line; Patellar Medial and Patellar Lateral  Edema:  Edema of the knee: 1/2\" swelling noted 4\" and 6\" above joint line on the R.  Circumference:      Joint Line:  Left:  Absence of swelling   Right:  Absence of swelling    Mobility Testing:      Patellofemoral Medial:  " Right: hypomobile    Patellofemoral Superior:  Right: normal  Patellofemoral Inferior:  Right: normal  Functional Testing:          Quad:    Single Leg Squat:  Left:      Right:        Bilateral Leg Squat:  Demonstrated ability to perform partial squat to 70 degrees knee flexion prior to onset of anterior knee pain; pt reported typically performing 1/2 kneel to avoid weight bearing through R knee               General     ROS    Assessment/Plan:    Patient is a 51 year old male with right side knee complaints.    Patient has the following significant findings with corresponding treatment plan.                Diagnosis 1:  R knee pain  Pain -  self management, education and home program  Decreased ROM/flexibility - manual therapy and therapeutic exercise  Decreased joint mobility - manual therapy and therapeutic exercise  Decreased strength - therapeutic exercise and therapeutic activities  Decreased function - therapeutic activities    Therapy Evaluation Codes:   1) History comprised of:   Personal factors that impact the plan of care:      Time since onset of symptoms.    Comorbidity factors that impact the plan of care are:      Osteoarthritis.     Medications impacting care: None.  2) Examination of Body Systems comprised of:   Body structures and functions that impact the plan of care:      Knee.   Activity limitations that impact the plan of care are:      Squatting/kneeling and Stairs.  3) Clinical presentation characteristics are:   Stable/Uncomplicated.  Cumulative Therapy Evaluation is: Low complexity.    Previous and current functional limitations:  (See Goal Flow Sheet for this information)    Short term and Long term goals: (See Goal Flow Sheet for this information)     Communication ability:  Patient appears to be able to clearly communicate and understand verbal and written communication and follow directions correctly.  Treatment Explanation - The following has been discussed with the patient:   RX  ordered/plan of care  Anticipated outcomes  Possible risks and side effects  This patient would benefit from PT intervention to resume normal activities.   Rehab potential is good.    Frequency:  1 X week, once daily  Duration:  for 6 visits  Discharge Plan:  Independent in home treatment program.  Reach maximal therapeutic benefit.    Please refer to the daily flowsheet for treatment today, total treatment time and time spent performing 1:1 timed codes.

## 2021-11-08 NOTE — PROGRESS NOTES
Physical Therapy Initial Evaluation  Subjective:    Patient Health History         Pain is reported as 2/10 on pain scale.  General health as reported by patient is good.  Pertinent medical history includes: overweight.   Red flags:  Pain at rest/night.     Surgeries include:  Other. Other surgery history details: Gallbladder.           Primary job tasks include:  Driving, lifting/carrying, operating a machine/assembly, prolonged standing and pushing/pulling.                                    Objective:  System    Physical Exam    General     ROS    Assessment/Plan:

## 2021-11-09 ENCOUNTER — THERAPY VISIT (OUTPATIENT)
Dept: PHYSICAL THERAPY | Facility: CLINIC | Age: 51
End: 2021-11-09
Payer: COMMERCIAL

## 2021-11-09 DIAGNOSIS — G89.29 CHRONIC PAIN OF RIGHT KNEE: ICD-10-CM

## 2021-11-09 DIAGNOSIS — M25.561 CHRONIC PAIN OF RIGHT KNEE: ICD-10-CM

## 2021-11-09 PROCEDURE — 97110 THERAPEUTIC EXERCISES: CPT | Mod: GP | Performed by: PHYSICAL THERAPIST

## 2021-11-16 ENCOUNTER — THERAPY VISIT (OUTPATIENT)
Dept: PHYSICAL THERAPY | Facility: CLINIC | Age: 51
End: 2021-11-16
Payer: COMMERCIAL

## 2021-11-16 DIAGNOSIS — M25.561 CHRONIC PAIN OF RIGHT KNEE: ICD-10-CM

## 2021-11-16 DIAGNOSIS — G89.29 CHRONIC PAIN OF RIGHT KNEE: ICD-10-CM

## 2021-11-16 PROCEDURE — 97530 THERAPEUTIC ACTIVITIES: CPT | Mod: GP | Performed by: PHYSICAL THERAPIST

## 2021-11-16 PROCEDURE — 97110 THERAPEUTIC EXERCISES: CPT | Mod: GP | Performed by: PHYSICAL THERAPIST

## 2021-11-30 ENCOUNTER — THERAPY VISIT (OUTPATIENT)
Dept: PHYSICAL THERAPY | Facility: CLINIC | Age: 51
End: 2021-11-30
Payer: COMMERCIAL

## 2021-11-30 DIAGNOSIS — G89.29 CHRONIC PAIN OF RIGHT KNEE: ICD-10-CM

## 2021-11-30 DIAGNOSIS — M25.561 CHRONIC PAIN OF RIGHT KNEE: ICD-10-CM

## 2021-11-30 PROCEDURE — 97110 THERAPEUTIC EXERCISES: CPT | Mod: GP | Performed by: PHYSICAL THERAPIST

## 2022-01-25 PROBLEM — G89.29 CHRONIC PAIN OF RIGHT KNEE: Status: RESOLVED | Noted: 2021-11-09 | Resolved: 2022-01-25

## 2022-01-25 PROBLEM — M25.561 CHRONIC PAIN OF RIGHT KNEE: Status: RESOLVED | Noted: 2021-11-09 | Resolved: 2022-01-25

## 2022-01-25 NOTE — PROGRESS NOTES
DISCHARGE REPORT    Progress reporting period is from 11-2-21 to 11-30-21. Pt completed 4 sessions of PT.       SUBJECTIVE  Subjective changes noted by patient:  Reports stiffness remains decreased in the AM upon awakening. 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    Current pain level is 0-1/10.     Previous pain level was  0-3/10.   Changes in function:  Yes (See Goal flowsheet attached for changes in current functional level)  Adverse reaction to treatment or activity: activity - see above.    OBJECTIVE  Objective info as of 11-30-21: Knee ROM: flexion: 135 degrees; knee extension: 0 degrees; no provocation of knee pain with OP into knee extension. Improved medial patellar mobility.     ASSESSMENT/PLAN  Updated problem list and treatment plan: Diagnosis 1:  R knee pain   STG/LTGs have been met or progress has been made towards goals:  Yes (See Goal flow sheet completed today.)  Assessment of Progress: Patient is meeting short term goals and is progressing towards long term goals.  Self Management Plans:  Patient is independent in a home treatment program.  Edward continues to require the following intervention to meet STG and LTG's:  Discussed one additional visit at time of last scheduled visit; due to time since last scheduled visit will discharge.    Recommendations:  Discharge.    Please refer to the daily flowsheet for treatment today, total treatment time and time spent performing 1:1 timed codes.

## 2022-04-22 ENCOUNTER — IMMUNIZATION (OUTPATIENT)
Dept: NURSING | Facility: CLINIC | Age: 52
End: 2022-04-22
Payer: COMMERCIAL

## 2022-04-22 PROCEDURE — 0054A COVID-19,PF,PFIZER (12+ YRS): CPT

## 2022-04-22 PROCEDURE — 91305 COVID-19,PF,PFIZER (12+ YRS): CPT

## 2022-10-14 ENCOUNTER — IMMUNIZATION (OUTPATIENT)
Dept: NURSING | Facility: CLINIC | Age: 52
End: 2022-10-14
Payer: COMMERCIAL

## 2022-10-14 PROCEDURE — 0124A COVID-19,PF,PFIZER BOOSTER BIVALENT: CPT

## 2022-10-14 PROCEDURE — 91312 COVID-19,PF,PFIZER BOOSTER BIVALENT: CPT

## 2022-12-08 ENCOUNTER — IMMUNIZATION (OUTPATIENT)
Dept: NURSING | Facility: CLINIC | Age: 52
End: 2022-12-08
Payer: COMMERCIAL

## 2022-12-08 PROCEDURE — 90682 RIV4 VACC RECOMBINANT DNA IM: CPT

## 2022-12-08 PROCEDURE — G0008 ADMIN INFLUENZA VIRUS VAC: HCPCS

## 2023-11-27 ENCOUNTER — IMMUNIZATION (OUTPATIENT)
Dept: NURSING | Facility: CLINIC | Age: 53
End: 2023-11-27
Payer: COMMERCIAL

## 2023-11-27 PROCEDURE — 90471 IMMUNIZATION ADMIN: CPT

## 2023-11-27 PROCEDURE — 90480 ADMN SARSCOV2 VAC 1/ONLY CMP: CPT

## 2023-11-27 PROCEDURE — 90686 IIV4 VACC NO PRSV 0.5 ML IM: CPT

## 2023-11-27 PROCEDURE — 91320 SARSCV2 VAC 30MCG TRS-SUC IM: CPT

## 2024-05-01 ENCOUNTER — OFFICE VISIT (OUTPATIENT)
Dept: URGENT CARE | Facility: URGENT CARE | Age: 54
End: 2024-05-01
Payer: COMMERCIAL

## 2024-05-01 ENCOUNTER — ANCILLARY PROCEDURE (OUTPATIENT)
Dept: GENERAL RADIOLOGY | Facility: CLINIC | Age: 54
End: 2024-05-01
Attending: PHYSICIAN ASSISTANT
Payer: COMMERCIAL

## 2024-05-01 VITALS
SYSTOLIC BLOOD PRESSURE: 148 MMHG | TEMPERATURE: 98.4 F | DIASTOLIC BLOOD PRESSURE: 86 MMHG | HEART RATE: 103 BPM | RESPIRATION RATE: 20 BRPM | OXYGEN SATURATION: 98 %

## 2024-05-01 DIAGNOSIS — S93.602A FOOT SPRAIN, LEFT, INITIAL ENCOUNTER: Primary | ICD-10-CM

## 2024-05-01 DIAGNOSIS — S99.922A FOOT INJURY, LEFT, INITIAL ENCOUNTER: ICD-10-CM

## 2024-05-01 PROCEDURE — 73630 X-RAY EXAM OF FOOT: CPT | Mod: TC | Performed by: RADIOLOGY

## 2024-05-01 PROCEDURE — 99214 OFFICE O/P EST MOD 30 MIN: CPT | Performed by: PHYSICIAN ASSISTANT

## 2024-05-01 NOTE — PATIENT INSTRUCTIONS
(J03.533E) Foot sprain, left, initial encounter  (primary encounter diagnosis)  Comment:   Plan: ibuprofen as needed.  Ace wrap for compression and comfort.      Follow up with orthopedics should symptoms persist or worsen.      (L70.062C) Foot injury, left, initial encounter  Comment: xray is negative for fracture today  Plan: XR Foot Left G/E 3 Views

## 2024-05-01 NOTE — PROGRESS NOTES
Patient presents with:  Urgent Care: Pt presents with injury of the left foot/ injury. Pt says he tripped today causing the injury.    (C28.726Z) Foot sprain, left, initial encounter  (primary encounter diagnosis)  Comment:   Plan: ibuprofen as needed.  Ace wrap for compression and comfort.      Follow up with orthopedics should symptoms persist or worsen.      (E10.537W) Foot injury, left, initial encounter  Comment: xray is negative for fracture today  Plan: XR Foot Left G/E 3 Views            At the end of the encounter, I discussed results, diagnosis, medications. Discussed red flags for immediate return to clinic/ER, as well as indications for follow up if no improvement. Patient understood and agreed to plan. Patient was stable for discharge         SUBJECTIVE:   Edward Szymanski is a 53 year old male who presents today with left lateral midfoot pain after rolling his foot at home working in the garage.  Denies any other injury.  Pain with weightbearing.        Patient Active Problem List   Diagnosis    HYPERLIPIDEMIA LDL GOAL <130    Generalized anxiety disorder    Choledocholithiasis    Pancreatitis         Past Medical History:   Diagnosis Date    Allergic rhinitis     High cholesterol     Hyperlipidemia          Current Outpatient Medications   Medication Sig Dispense Refill    Multiple Vitamins-Iron (DAILY-JOSE/IRON/BETA-CAROTENE) TABS TAKE 1 TABLET BY MOUTH DAILY. (Patient not taking: Reported on 10/19/2020) 30 tablet 7     Social History     Tobacco Use    Smoking status: Never Smoker    Smokeless tobacco: Never Used   Substance Use Topics    Alcohol use: Not on file     Family History   Problem Relation Age of Onset    Diabetes Mother     Diabetes Father          ROS:    10 point ROS of systems including Constitutional, Eyes, Respiratory, Cardiovascular, Gastroenterology, Genitourinary, Integumentary, Muscularskeletal, Psychiatric ,neurological were all negative except for pertinent positives noted in my  HPI       OBJECTIVE:  BP (!) 148/86   Pulse 103   Temp 98.4  F (36.9  C) (Tympanic)   Resp 20   SpO2 98%   Physical Exam:  GENERAL APPEARANCE: healthy, alert and no distress  Extremities: Left foot: Tenderness and swelling left lateral foot.  NEURO: Normal strength and tone, sensory exam grossly normal,  normal speech and mentation  SKIN: no suspicious lesions or rashes    X-Ray was done, my findings are: No fractures

## 2024-06-11 NOTE — ANESTHESIA CARE TRANSFER NOTE
Patient: Edward Nessa    Procedure(s):  LAPAROSCOPIC CHOLECYSTECTOMY WITH CHOLANGIOGRAMS  - Wound Class: II-Clean Contaminated    Diagnosis: choledocolithasis  Diagnosis Additional Information: No value filed.    Anesthesia Type:   General     Note:  Airway :Face Mask  Patient transferred to:PACU  Comments: Adequate spontaneous respirations.      Vitals: (Last set prior to Anesthesia Care Transfer)    CRNA VITALS  9/29/2017 1100 - 9/29/2017 1134      9/29/2017             Pulse: 82    SpO2: 100 %    Resp Rate (observed): 17                Electronically Signed By: ALISHA Bates CRNA  September 29, 2017  11:34 AM   Render Note In Bullet Format When Appropriate: No Number Of Freeze-Thaw Cycles: 2 freeze-thaw cycles Post-Care Instructions: I reviewed with the patient in detail post-care instructions. Patient is to wear sunprotection, and avoid picking at any of the treated lesions. Pt may apply Vaseline to crusted or scabbing areas. Detail Level: Detailed Medical Necessity Information: It is in your best interest to select a reason for this procedure from the list below. All of these items fulfill various CMS LCD requirements except the new and changing color options. Show Topical Anesthesia Variable?: Yes Consent: The patient's consent was obtained including but not limited to risks of crusting, scabbing, blistering, scarring, darker or lighter pigmentary change, recurrence, incomplete removal and infection. Medical Necessity Clause: This procedure was medically necessary because the lesions that were treated were: Spray Paint Text: The liquid nitrogen was applied to the skin utilizing a spray paint frosting technique. Number Of Freeze-Thaw Cycles: 3 freeze-thaw cycles Duration Of Freeze Thaw-Cycle (Seconds): 2

## 2024-09-24 ENCOUNTER — IMMUNIZATION (OUTPATIENT)
Dept: INTERNAL MEDICINE | Facility: CLINIC | Age: 54
End: 2024-09-24
Payer: COMMERCIAL

## 2024-09-24 PROCEDURE — 91320 SARSCV2 VAC 30MCG TRS-SUC IM: CPT

## 2024-09-24 PROCEDURE — 90480 ADMN SARSCOV2 VAC 1/ONLY CMP: CPT

## (undated) DEVICE — CATH IV ANGIO INTRO 12GA 382277

## (undated) DEVICE — TUBING IV EXTENSION SET ANESTHESIA 34" MLL 2C6227

## (undated) DEVICE — ENDO TROCAR 05MM VERSAONE BLADELESS W/STD FIX CAN NONB5STF

## (undated) DEVICE — LINEN POUCH DBL 5427

## (undated) DEVICE — ESU CORD MONOPOLAR 10'  E0510

## (undated) DEVICE — GLOVE PROTEXIS W/NEU-THERA 7.5  2D73TE75

## (undated) DEVICE — CATH CHOLANGIOGRAM KUMAR CC-019

## (undated) DEVICE — GOWN XLG DISP 9545

## (undated) DEVICE — Device

## (undated) DEVICE — SOL NACL 0.9% IRRIG 3000ML BAG 2B7477

## (undated) DEVICE — SPHINCTEROTOME 7FRX25MM TRITOME G22555

## (undated) DEVICE — BAG CLEAR TRASH 1.3M 39X33" P4040C

## (undated) DEVICE — COVER FOOTSWITCH W/CINCH 20X24" 923267

## (undated) DEVICE — SUCTION CANISTER MEDIVAC LINER 3000ML W/LID 65651-530

## (undated) DEVICE — COVER FOOTSWITCH URO

## (undated) DEVICE — DRAPE LEGGINGS 8421

## (undated) DEVICE — LINEN FULL SHEET 5511

## (undated) DEVICE — RAD RX ISOVUE 300 (50ML) 61% IOPAMIDOL CHARGE PER ML

## (undated) DEVICE — ACROBAT 2 CALIBRATED TIP WIRE GUIDE

## (undated) DEVICE — BALLOON EXTRACTION 3-LUMEN 15X190MM 2.8MM TL B-V233P-A

## (undated) DEVICE — SUCTION IRR STRYKERFLOW II W/TIP 250-070-520

## (undated) DEVICE — SOL WATER IRRIG 500ML BOTTLE 2F7113

## (undated) DEVICE — SOL NACL 0.9% INJ 250ML BAG 2B1322Q

## (undated) DEVICE — PREP SKIN SCRUB TRAY 4461A

## (undated) DEVICE — BLADE CLIPPER 3M 9670

## (undated) DEVICE — DECANTER BAG 2002S

## (undated) DEVICE — CLIP APPLIER ENDO 05MM MED/LG 176630

## (undated) DEVICE — CONNECTOR STOPCOCK 3 WAY MALE LL HI-FLO MX9311L

## (undated) DEVICE — SYR 30ML LL W/O NDL 302832

## (undated) DEVICE — GLOVE PROTEXIS BLUE W/NEU-THERA 8.0  2D73EB80

## (undated) DEVICE — PREP SCRUB SOL EXIDINE 4% CHG 4OZ 29002-404

## (undated) DEVICE — SU VICRYL 1 CT-2 27" J335H

## (undated) DEVICE — PACK ERCP CUSTOM RIDGES

## (undated) DEVICE — TAPE DURAPORE 3" SILK 1538-3

## (undated) DEVICE — ENDO TROCAR BLUNT 100MM W/THRD ANCHOR BLUNTPORT BPT12STS

## (undated) DEVICE — SU VICRYL 4-0 PS-2 18" UND J496H

## (undated) DEVICE — LINEN HALF SHEET 5512

## (undated) DEVICE — ENDO POUCH GOLD 10MM ECATCH 173050G

## (undated) DEVICE — LINEN TOWEL PACK X10 5473

## (undated) DEVICE — DRAPE C-ARM 60X42" 1013

## (undated) DEVICE — ESU GROUND PAD ADULT W/CORD E7507

## (undated) DEVICE — SOL WATER IRRIG 1000ML BOTTLE 2F7114

## (undated) DEVICE — ENDO CANNULA 05MM VERSAONE UNIVERSAL UNVCA5STF

## (undated) RX ORDER — GLYCOPYRROLATE 0.2 MG/ML
INJECTION INTRAMUSCULAR; INTRAVENOUS
Status: DISPENSED
Start: 2017-09-12

## (undated) RX ORDER — FENTANYL CITRATE 50 UG/ML
INJECTION, SOLUTION INTRAMUSCULAR; INTRAVENOUS
Status: DISPENSED
Start: 2017-09-29

## (undated) RX ORDER — LIDOCAINE HYDROCHLORIDE 10 MG/ML
INJECTION, SOLUTION EPIDURAL; INFILTRATION; INTRACAUDAL; PERINEURAL
Status: DISPENSED
Start: 2017-09-29

## (undated) RX ORDER — BUPIVACAINE HYDROCHLORIDE AND EPINEPHRINE 2.5; 5 MG/ML; UG/ML
INJECTION, SOLUTION EPIDURAL; INFILTRATION; INTRACAUDAL; PERINEURAL
Status: DISPENSED
Start: 2017-09-29

## (undated) RX ORDER — CEFAZOLIN SODIUM 2 G/100ML
INJECTION, SOLUTION INTRAVENOUS
Status: DISPENSED
Start: 2017-09-29

## (undated) RX ORDER — NEOSTIGMINE METHYLSULFATE 1 MG/ML
VIAL (ML) INJECTION
Status: DISPENSED
Start: 2017-09-29

## (undated) RX ORDER — GLYCOPYRROLATE 0.2 MG/ML
INJECTION INTRAMUSCULAR; INTRAVENOUS
Status: DISPENSED
Start: 2017-09-29

## (undated) RX ORDER — PROPOFOL 10 MG/ML
INJECTION, EMULSION INTRAVENOUS
Status: DISPENSED
Start: 2017-09-29

## (undated) RX ORDER — DEXAMETHASONE SODIUM PHOSPHATE 4 MG/ML
INJECTION, SOLUTION INTRA-ARTICULAR; INTRALESIONAL; INTRAMUSCULAR; INTRAVENOUS; SOFT TISSUE
Status: DISPENSED
Start: 2017-09-29

## (undated) RX ORDER — KETOROLAC TROMETHAMINE 30 MG/ML
INJECTION, SOLUTION INTRAMUSCULAR; INTRAVENOUS
Status: DISPENSED
Start: 2017-09-29

## (undated) RX ORDER — FENTANYL CITRATE 50 UG/ML
INJECTION, SOLUTION INTRAMUSCULAR; INTRAVENOUS
Status: DISPENSED
Start: 2017-09-12

## (undated) RX ORDER — ONDANSETRON 2 MG/ML
INJECTION INTRAMUSCULAR; INTRAVENOUS
Status: DISPENSED
Start: 2017-09-29